# Patient Record
Sex: FEMALE | Race: WHITE | NOT HISPANIC OR LATINO | Employment: UNEMPLOYED | URBAN - METROPOLITAN AREA
[De-identification: names, ages, dates, MRNs, and addresses within clinical notes are randomized per-mention and may not be internally consistent; named-entity substitution may affect disease eponyms.]

---

## 2017-05-01 LAB
BUN SERPL-MCNC: 6.7 MG/DL (ref 7–18)
CREAT SERPL-MCNC: 0.74 MG/DL (ref 0.6–1.3)
GLUCOSE SERPL-MCNC: 88 MG/DL (ref 74–106)

## 2017-07-10 ENCOUNTER — HISTORICAL (OUTPATIENT)
Dept: RESPIRATORY THERAPY | Facility: HOSPITAL | Age: 42
End: 2017-07-10

## 2017-07-10 LAB
ABS NEUT (OLG): 13.86
ALBUMIN SERPL-MCNC: 3.4 GM/DL (ref 3.4–5)
ALBUMIN/GLOB SERPL: 0.8 RATIO (ref 1.1–2)
ALP SERPL-CCNC: 124 UNIT/L (ref 50–136)
ALT SERPL-CCNC: 58 UNIT/L (ref 12–78)
AST SERPL-CCNC: 19 UNIT/L (ref 10–37)
BASOPHILS # BLD AUTO: 0.05 X10(3)/MCL
BASOPHILS NFR BLD AUTO: 0.3 %
BILIRUB SERPL-MCNC: 0.4 MG/DL (ref 0.2–1)
BILIRUBIN DIRECT+TOT PNL SERPL-MCNC: 0.11 MG/DL (ref 0.05–0.2)
BILIRUBIN DIRECT+TOT PNL SERPL-MCNC: 0.29 MG/DL
BUN SERPL-MCNC: 5 MG/DL (ref 7–18)
CALCIUM SERPL-MCNC: 9.4 MG/DL (ref 8.5–10.1)
CHLORIDE SERPL-SCNC: 102 MMOL/L (ref 98–107)
CO2 SERPL-SCNC: 31 MMOL/L (ref 21–32)
CREAT SERPL-MCNC: 0.71 MG/DL (ref 0.55–1.02)
EOSINOPHIL # BLD AUTO: 0.56 X10(3)/MCL
EOSINOPHIL NFR BLD AUTO: 2.9 %
ERYTHROCYTE [DISTWIDTH] IN BLOOD BY AUTOMATED COUNT: 14 %
GLOBULIN SER-MCNC: 4.3 GM/DL (ref 2.4–3.5)
GLUCOSE SERPL-MCNC: 120 MG/DL (ref 74–106)
HCT VFR BLD AUTO: 40.2 % (ref 34–46)
HGB BLD-MCNC: 13.8 GM/DL (ref 11.3–15.4)
IMM GRANULOCYTES # BLD AUTO: 0.12 10*3/UL (ref 0–0.1)
IMM GRANULOCYTES NFR BLD AUTO: 0.6 % (ref 0–1)
LYMPHOCYTES # BLD AUTO: 3.58 X10(3)/MCL
LYMPHOCYTES NFR BLD AUTO: 18.5 %
MCH RBC QN AUTO: 30.3 PG (ref 27–33)
MCHC RBC AUTO-ENTMCNC: 34.3 GM/DL (ref 32–35)
MCV RBC AUTO: 88.2 FL (ref 81–97)
MONOCYTES # BLD AUTO: 1.13 X10(3)/MCL
MONOCYTES NFR BLD AUTO: 5.9 %
NEUTROPHILS # BLD AUTO: 13.86 X10(3)/MCL
NEUTROPHILS NFR BLD AUTO: 71.8 %
PLATELET # BLD AUTO: 323 X10(3)/MCL (ref 151–368)
PMV BLD AUTO: 11 FL
POTASSIUM SERPL-SCNC: 3.6 MMOL/L (ref 3.5–5.1)
PROT SERPL-MCNC: 7.7 GM/DL (ref 6.4–8.2)
RBC # BLD AUTO: 4.56 X10(6)/MCL (ref 3.9–5)
SODIUM SERPL-SCNC: 141 MMOL/L (ref 136–145)
WBC # SPEC AUTO: 19.3 X10(3)/MCL (ref 3.4–9.2)

## 2017-07-31 ENCOUNTER — HISTORICAL (OUTPATIENT)
Dept: LAB | Facility: HOSPITAL | Age: 42
End: 2017-07-31

## 2017-07-31 LAB
ABS NEUT (OLG): 11.11
BASOPHILS # BLD AUTO: 0.05 X10(3)/MCL
BASOPHILS NFR BLD AUTO: 0.3 %
EOSINOPHIL # BLD AUTO: 0.52 X10(3)/MCL
EOSINOPHIL NFR BLD AUTO: 3.1 %
ERYTHROCYTE [DISTWIDTH] IN BLOOD BY AUTOMATED COUNT: 14 %
HCT VFR BLD AUTO: 39.4 % (ref 34–46)
HGB BLD-MCNC: 13.4 GM/DL (ref 11.3–15.4)
IMM GRANULOCYTES # BLD AUTO: 0.08 10*3/UL (ref 0–0.1)
IMM GRANULOCYTES NFR BLD AUTO: 0.5 % (ref 0–1)
LYMPHOCYTES # BLD AUTO: 4.02 X10(3)/MCL
LYMPHOCYTES NFR BLD AUTO: 24.3 %
MCH RBC QN AUTO: 30 PG (ref 27–33)
MCHC RBC AUTO-ENTMCNC: 34 GM/DL (ref 32–35)
MCV RBC AUTO: 88.3 FL (ref 81–97)
MONOCYTES # BLD AUTO: 0.77 X10(3)/MCL
MONOCYTES NFR BLD AUTO: 4.7 %
NEUTROPHILS # BLD AUTO: 11.11 X10(3)/MCL
NEUTROPHILS NFR BLD AUTO: 67.1 %
PLATELET # BLD AUTO: 330 X10(3)/MCL (ref 151–368)
PMV BLD AUTO: 11 FL
RBC # BLD AUTO: 4.46 X10(6)/MCL (ref 3.9–5)
WBC # SPEC AUTO: 16.55 X10(3)/MCL (ref 3.4–9.2)

## 2018-07-26 ENCOUNTER — HISTORICAL (OUTPATIENT)
Dept: LAB | Facility: HOSPITAL | Age: 43
End: 2018-07-26

## 2018-07-26 LAB
ABS NEUT (OLG): 9.72
ALBUMIN SERPL-MCNC: 3.5 GM/DL (ref 3.4–5)
ALBUMIN/GLOB SERPL: 0.8 RATIO (ref 1.1–2)
ALP SERPL-CCNC: 125 UNIT/L (ref 46–116)
ALT SERPL-CCNC: 43 UNIT/L (ref 12–78)
AST SERPL-CCNC: 14 UNIT/L (ref 10–37)
BASOPHILS # BLD AUTO: 0.07 X10(3)/MCL
BASOPHILS NFR BLD AUTO: 0.5 %
BILIRUB SERPL-MCNC: 0.3 MG/DL (ref 0.2–1)
BILIRUBIN DIRECT+TOT PNL SERPL-MCNC: 0.11 MG/DL (ref 0–0.2)
BILIRUBIN DIRECT+TOT PNL SERPL-MCNC: 0.19 MG/DL
BUN SERPL-MCNC: 7 MG/DL (ref 7–18)
CALCIUM SERPL-MCNC: 9.9 MG/DL (ref 8.5–10.1)
CHLORIDE SERPL-SCNC: 102 MMOL/L (ref 98–107)
CHOLEST SERPL-MCNC: 217 MG/DL (ref 0–200)
CHOLEST/HDLC SERPL: 7 {RATIO} (ref 0–4)
CO2 SERPL-SCNC: 27.4 MMOL/L (ref 21–32)
CREAT SERPL-MCNC: 0.6 MG/DL (ref 0.55–1.02)
CREAT UR-MCNC: 143 MG/DL
EOSINOPHIL # BLD AUTO: 0.29 X10(3)/MCL
EOSINOPHIL NFR BLD AUTO: 2 %
ERYTHROCYTE [DISTWIDTH] IN BLOOD BY AUTOMATED COUNT: 15 %
EST. AVERAGE GLUCOSE BLD GHB EST-MCNC: 123 MG/DL
GLOBULIN SER-MCNC: 4.4 GM/DL (ref 2.4–3.5)
GLUCOSE SERPL-MCNC: 105 MG/DL (ref 74–106)
HBA1C MFR BLD: 5.9 % (ref 4.5–6.2)
HCT VFR BLD AUTO: 41.7 % (ref 34–46)
HDLC SERPL-MCNC: 29 MG/DL (ref 35–60)
HGB BLD-MCNC: 14.3 GM/DL (ref 11.3–15.4)
IMM GRANULOCYTES # BLD AUTO: 0.05 10*3/UL (ref 0–0.1)
IMM GRANULOCYTES NFR BLD AUTO: 0.3 % (ref 0–1)
LDLC SERPL CALC-MCNC: 155 MG/DL (ref 50–140)
LYMPHOCYTES # BLD AUTO: 3.59 X10(3)/MCL
LYMPHOCYTES NFR BLD AUTO: 24.2 %
MCH RBC QN AUTO: 29.9 PG (ref 27–33)
MCHC RBC AUTO-ENTMCNC: 34.3 GM/DL (ref 32–35)
MCV RBC AUTO: 87.2 FL (ref 81–97)
MICROALBUMIN UR-MCNC: 1.3 MG/DL
MICROALBUMIN/CREAT RATIO PNL UR: 9.1 MG/GM CR (ref 0–30)
MONOCYTES # BLD AUTO: 1.14 X10(3)/MCL
MONOCYTES NFR BLD AUTO: 7.7 %
NEUTROPHILS # BLD AUTO: 9.72 X10(3)/MCL
NEUTROPHILS NFR BLD AUTO: 65.3 %
PLATELET # BLD AUTO: 322 X10(3)/MCL (ref 151–368)
PMV BLD AUTO: 11 FL
POTASSIUM SERPL-SCNC: 3.4 MMOL/L (ref 3.5–5.1)
PROT SERPL-MCNC: 7.9 GM/DL (ref 6.4–8.2)
RBC # BLD AUTO: 4.78 X10(6)/MCL (ref 3.9–5)
SODIUM SERPL-SCNC: 140 MMOL/L (ref 136–145)
T4 SERPL-MCNC: 12.9 MCG/DL (ref 4.7–13.3)
TRIGL SERPL-MCNC: 164 MG/DL (ref 30–150)
TSH SERPL-ACNC: 2.32 MIU/ML (ref 0.35–3.75)
VLDLC SERPL CALC-MCNC: 33 MG/DL
WBC # SPEC AUTO: 14.86 X10(3)/MCL (ref 3.4–9.2)

## 2018-08-03 ENCOUNTER — HISTORICAL (OUTPATIENT)
Dept: RADIOLOGY | Facility: HOSPITAL | Age: 43
End: 2018-08-03

## 2018-11-19 ENCOUNTER — HISTORICAL (OUTPATIENT)
Dept: RADIOLOGY | Facility: HOSPITAL | Age: 43
End: 2018-11-19

## 2018-12-28 LAB — RAPID GROUP A STREP (OHS): POSITIVE

## 2019-02-12 ENCOUNTER — HISTORICAL (OUTPATIENT)
Dept: LAB | Facility: HOSPITAL | Age: 44
End: 2019-02-12

## 2019-02-12 LAB
BUN SERPL-MCNC: 7 MG/DL (ref 7–18)
CALCIUM SERPL-MCNC: 9.4 MG/DL (ref 8.5–10.1)
CHLORIDE SERPL-SCNC: 105 MMOL/L (ref 98–107)
CHOLEST SERPL-MCNC: 123 MG/DL (ref 50–200)
CHOLEST/HDLC SERPL: 4 {RATIO} (ref 0–5)
CO2 SERPL-SCNC: 33 MMOL/L (ref 21–32)
CREAT SERPL-MCNC: 0.77 MG/DL (ref 0.55–1.02)
CREAT/UREA NIT SERPL: 9
EST. AVERAGE GLUCOSE BLD GHB EST-MCNC: 134 MG/DL
GLUCOSE SERPL-MCNC: 113 MG/DL (ref 74–106)
HBA1C MFR BLD: 6.3 % (ref 4.5–6.2)
HDLC SERPL-MCNC: 28 MG/DL (ref 35–60)
LDLC SERPL CALC-MCNC: 83 MG/DL (ref 50–140)
POTASSIUM SERPL-SCNC: 4.3 MMOL/L (ref 3.5–5.1)
SODIUM SERPL-SCNC: 143 MMOL/L (ref 136–145)
TRIGL SERPL-MCNC: 60 MG/DL (ref 30–150)
VLDLC SERPL CALC-MCNC: 12 MG/DL

## 2019-04-11 ENCOUNTER — HISTORICAL (OUTPATIENT)
Dept: LAB | Facility: HOSPITAL | Age: 44
End: 2019-04-11

## 2019-04-11 LAB — TSH SERPL-ACNC: 2.15 MIU/ML (ref 0.35–3.75)

## 2019-07-24 ENCOUNTER — HISTORICAL (OUTPATIENT)
Dept: LAB | Facility: HOSPITAL | Age: 44
End: 2019-07-24

## 2019-07-24 LAB
ABS NEUT (OLG): 9.13
ALBUMIN SERPL-MCNC: 3.4 GM/DL (ref 3.4–5)
ALBUMIN/GLOB SERPL: 0.8 RATIO (ref 1.1–2)
ALP SERPL-CCNC: 127 UNIT/L (ref 46–116)
ALT SERPL-CCNC: 44 UNIT/L (ref 12–78)
AST SERPL-CCNC: 19 UNIT/L (ref 10–37)
BASOPHILS # BLD AUTO: 0.04 X10(3)/MCL
BASOPHILS NFR BLD AUTO: 0.3 %
BILIRUB SERPL-MCNC: 0.5 MG/DL (ref 0.2–1)
BILIRUBIN DIRECT+TOT PNL SERPL-MCNC: 0.16 MG/DL (ref 0–0.2)
BILIRUBIN DIRECT+TOT PNL SERPL-MCNC: 0.34 MG/DL
BUN SERPL-MCNC: 7 MG/DL (ref 7–18)
CALCIUM SERPL-MCNC: 9.6 MG/DL (ref 8.5–10.1)
CHLORIDE SERPL-SCNC: 103 MMOL/L (ref 98–107)
CHOLEST SERPL-MCNC: 102 MG/DL (ref 50–200)
CHOLEST/HDLC SERPL: 4 {RATIO} (ref 0–5)
CO2 SERPL-SCNC: 30.6 MMOL/L (ref 21–32)
CREAT SERPL-MCNC: 0.62 MG/DL (ref 0.55–1.02)
CREAT UR-MCNC: 399 MG/DL
EOSINOPHIL # BLD AUTO: 0.37 X10(3)/MCL
EOSINOPHIL NFR BLD AUTO: 2.7 %
ERYTHROCYTE [DISTWIDTH] IN BLOOD BY AUTOMATED COUNT: 15 %
EST. AVERAGE GLUCOSE BLD GHB EST-MCNC: 126 MG/DL
GLOBULIN SER-MCNC: 4.1 GM/DL (ref 2.4–3.5)
GLUCOSE SERPL-MCNC: 114 MG/DL (ref 74–106)
HBA1C MFR BLD: 6 % (ref 4.5–6.2)
HCT VFR BLD AUTO: 43 % (ref 34–46)
HDLC SERPL-MCNC: 24 MG/DL (ref 35–60)
HGB BLD-MCNC: 14.1 GM/DL (ref 11.3–15.4)
IMM GRANULOCYTES # BLD AUTO: 0.04 10*3/UL (ref 0–0.1)
IMM GRANULOCYTES NFR BLD AUTO: 0.3 % (ref 0–1)
LDLC SERPL CALC-MCNC: 61 MG/DL (ref 50–140)
LIPASE SERPL-CCNC: 106 UNIT/L (ref 73–393)
LYMPHOCYTES # BLD AUTO: 3.21 X10(3)/MCL
LYMPHOCYTES NFR BLD AUTO: 23.6 %
MCH RBC QN AUTO: 28.9 PG (ref 27–33)
MCHC RBC AUTO-ENTMCNC: 32.8 GM/DL (ref 32–35)
MCV RBC AUTO: 88.1 FL (ref 81–97)
MICROALBUMIN UR-MCNC: 2.3 MG/DL
MICROALBUMIN/CREAT RATIO PNL UR: 5.8 MG/GM CR (ref 0–30)
MONOCYTES # BLD AUTO: 0.81 X10(3)/MCL
MONOCYTES NFR BLD AUTO: 6 %
NEUTROPHILS # BLD AUTO: 9.13 X10(3)/MCL
NEUTROPHILS NFR BLD AUTO: 67.1 %
PLATELET # BLD AUTO: 368 X10(3)/MCL (ref 140–450)
PMV BLD AUTO: 11 FL
POTASSIUM SERPL-SCNC: 3.7 MMOL/L (ref 3.5–5.1)
PROT SERPL-MCNC: 7.5 GM/DL (ref 6.4–8.2)
RBC # BLD AUTO: 4.88 X10(6)/MCL (ref 3.9–5)
SODIUM SERPL-SCNC: 142 MMOL/L (ref 136–145)
TRIGL SERPL-MCNC: 83 MG/DL (ref 30–150)
TSH SERPL-ACNC: 1.94 MIU/ML (ref 0.35–3.75)
VLDLC SERPL CALC-MCNC: 17 MG/DL
WBC # SPEC AUTO: 13.6 X10(3)/MCL (ref 3.4–9.2)

## 2019-07-31 ENCOUNTER — HISTORICAL (OUTPATIENT)
Dept: LAB | Facility: HOSPITAL | Age: 44
End: 2019-07-31

## 2019-07-31 LAB
DEPRECATED CALCIDIOL+CALCIFEROL SERPL-MC: 17.73 NG/ML (ref 30–80)
GGT SERPL-CCNC: 42 UNIT/L (ref 5–85)

## 2019-08-07 ENCOUNTER — HISTORICAL (OUTPATIENT)
Dept: OCCUPATIONAL THERAPY | Facility: HOSPITAL | Age: 44
End: 2019-08-07

## 2019-08-13 ENCOUNTER — HISTORICAL (OUTPATIENT)
Dept: OCCUPATIONAL THERAPY | Facility: HOSPITAL | Age: 44
End: 2019-08-13

## 2019-08-16 ENCOUNTER — HISTORICAL (OUTPATIENT)
Dept: OCCUPATIONAL THERAPY | Facility: HOSPITAL | Age: 44
End: 2019-08-16

## 2019-08-20 ENCOUNTER — HISTORICAL (OUTPATIENT)
Dept: OCCUPATIONAL THERAPY | Facility: HOSPITAL | Age: 44
End: 2019-08-20

## 2019-08-27 ENCOUNTER — HISTORICAL (OUTPATIENT)
Dept: OCCUPATIONAL THERAPY | Facility: HOSPITAL | Age: 44
End: 2019-08-27

## 2019-09-05 ENCOUNTER — HOSPITAL ENCOUNTER (OUTPATIENT)
Dept: MEDSURG UNIT | Facility: HOSPITAL | Age: 44
End: 2019-09-07
Attending: FAMILY MEDICINE | Admitting: FAMILY MEDICINE

## 2019-09-06 LAB
ABS NEUT (OLG): 5.47
ABS NEUT (OLG): 6.72
BASOPHILS # BLD AUTO: 0.04 X10(3)/MCL
BASOPHILS # BLD AUTO: 0.05 X10(3)/MCL
BASOPHILS NFR BLD AUTO: 0.5 %
BASOPHILS NFR BLD AUTO: 0.5 %
BUN SERPL-MCNC: 9 MG/DL (ref 7–18)
C DIFF INTERP: NEGATIVE
CALCIUM SERPL-MCNC: 8.4 MG/DL (ref 8.5–10.1)
CHLORIDE SERPL-SCNC: 105 MMOL/L (ref 98–107)
CO2 SERPL-SCNC: 28.5 MMOL/L (ref 21–32)
CREAT SERPL-MCNC: 0.76 MG/DL (ref 0.55–1.02)
CREAT/UREA NIT SERPL: 12
EOSINOPHIL # BLD AUTO: 0.28 X10(3)/MCL
EOSINOPHIL # BLD AUTO: 0.28 X10(3)/MCL
EOSINOPHIL NFR BLD AUTO: 2.8 %
EOSINOPHIL NFR BLD AUTO: 3.6 %
ERYTHROCYTE [DISTWIDTH] IN BLOOD BY AUTOMATED COUNT: 14 %
ERYTHROCYTE [DISTWIDTH] IN BLOOD BY AUTOMATED COUNT: 14 %
GLUCOSE SERPL-MCNC: 160 MG/DL (ref 74–106)
HCT VFR BLD AUTO: 40.5 % (ref 34–46)
HCT VFR BLD AUTO: 41 % (ref 34–46)
HGB BLD-MCNC: 13.5 GM/DL (ref 11.3–15.4)
HGB BLD-MCNC: 13.9 GM/DL (ref 11.3–15.4)
IMM GRANULOCYTES # BLD AUTO: 0.02 10*3/UL (ref 0–0.1)
IMM GRANULOCYTES # BLD AUTO: 0.02 10*3/UL (ref 0–0.1)
IMM GRANULOCYTES NFR BLD AUTO: 0.2 % (ref 0–1)
IMM GRANULOCYTES NFR BLD AUTO: 0.3 % (ref 0–1)
LYMPHOCYTES # BLD AUTO: 1.54 X10(3)/MCL
LYMPHOCYTES # BLD AUTO: 1.98 X10(3)/MCL
LYMPHOCYTES NFR BLD AUTO: 19.6 %
LYMPHOCYTES NFR BLD AUTO: 20 %
MCH RBC QN AUTO: 29.2 PG (ref 27–33)
MCH RBC QN AUTO: 29.4 PG (ref 27–33)
MCHC RBC AUTO-ENTMCNC: 33.3 GM/DL (ref 32–35)
MCHC RBC AUTO-ENTMCNC: 33.9 GM/DL (ref 32–35)
MCV RBC AUTO: 86.7 FL (ref 81–97)
MCV RBC AUTO: 87.5 FL (ref 81–97)
MONOCYTES # BLD AUTO: 0.49 X10(3)/MCL
MONOCYTES # BLD AUTO: 0.87 X10(3)/MCL
MONOCYTES NFR BLD AUTO: 6.3 %
MONOCYTES NFR BLD AUTO: 8.8 %
NEUTROPHILS # BLD AUTO: 5.47 X10(3)/MCL
NEUTROPHILS # BLD AUTO: 6.72 X10(3)/MCL
NEUTROPHILS NFR BLD AUTO: 67.7 %
NEUTROPHILS NFR BLD AUTO: 69.7 %
PLATELET # BLD AUTO: 243 X10(3)/MCL (ref 140–450)
PLATELET # BLD AUTO: 254 X10(3)/MCL (ref 140–450)
PMV BLD AUTO: 11 FL
PMV BLD AUTO: 11 FL
POTASSIUM SERPL-SCNC: 3.4 MMOL/L (ref 3.5–5.1)
RBC # BLD AUTO: 4.63 X10(6)/MCL (ref 3.9–5)
RBC # BLD AUTO: 4.73 X10(6)/MCL (ref 3.9–5)
SODIUM SERPL-SCNC: 141 MMOL/L (ref 136–145)
WBC # SPEC AUTO: 7.84 X10(3)/MCL (ref 3.4–9.2)
WBC # SPEC AUTO: 9.92 X10(3)/MCL (ref 3.4–9.2)

## 2019-09-09 LAB — FINAL CULTURE: NORMAL

## 2019-11-08 ENCOUNTER — HISTORICAL (OUTPATIENT)
Dept: RADIOLOGY | Facility: HOSPITAL | Age: 44
End: 2019-11-08

## 2019-11-15 ENCOUNTER — HISTORICAL (OUTPATIENT)
Dept: RADIOLOGY | Facility: HOSPITAL | Age: 44
End: 2019-11-15

## 2020-02-19 ENCOUNTER — HISTORICAL (OUTPATIENT)
Dept: ADMINISTRATIVE | Facility: HOSPITAL | Age: 45
End: 2020-02-19

## 2020-02-19 LAB
ALBUMIN SERPL-MCNC: 3.6 GM/DL (ref 3.5–5.2)
ALBUMIN/GLOB SERPL: 0.9 RATIO (ref 1.1–2)
ALP SERPL-CCNC: 132 UNIT/L (ref 30–113)
ALT SERPL-CCNC: 32 UNIT/L (ref 10–45)
AST SERPL-CCNC: 14 UNIT/L (ref 15–37)
BILIRUB SERPL-MCNC: 0.4 MG/DL
BILIRUBIN DIRECT+TOT PNL SERPL-MCNC: 0.2 MG/DL
BILIRUBIN DIRECT+TOT PNL SERPL-MCNC: 0.2 MG/DL (ref 0–0.5)
BUN SERPL-MCNC: 8 MG/DL (ref 7–18.7)
CALCIUM SERPL-MCNC: 9.9 MG/DL (ref 8.4–10.2)
CHLORIDE SERPL-SCNC: 104 MMOL/L (ref 98–107)
CO2 SERPL-SCNC: 27 MEQ/L (ref 22–29)
CREAT SERPL-MCNC: 0.73 MG/DL (ref 0.55–1.02)
EST. AVERAGE GLUCOSE BLD GHB EST-MCNC: 126 MG/DL
GLOBULIN SER-MCNC: 3.8 GM/DL (ref 2.4–3.5)
GLUCOSE SERPL-MCNC: 144 MG/DL (ref 74–100)
HBA1C MFR BLD: 6 % (ref 4–6)
POTASSIUM SERPL-SCNC: 4.1 MMOL/L (ref 3.5–5.1)
PROT SERPL-MCNC: 7.4 GM/DL (ref 6.4–8.3)
SODIUM SERPL-SCNC: 140 MMOL/L (ref 136–145)

## 2020-06-24 ENCOUNTER — HISTORICAL (OUTPATIENT)
Dept: RADIOLOGY | Facility: HOSPITAL | Age: 45
End: 2020-06-24

## 2020-07-16 ENCOUNTER — HISTORICAL (OUTPATIENT)
Dept: RADIOLOGY | Facility: HOSPITAL | Age: 45
End: 2020-07-16

## 2020-09-18 ENCOUNTER — HISTORICAL (OUTPATIENT)
Dept: LAB | Facility: HOSPITAL | Age: 45
End: 2020-09-18

## 2020-09-18 LAB
EST. AVERAGE GLUCOSE BLD GHB EST-MCNC: 126 MG/DL
HBA1C MFR BLD: 6 % (ref 4–6)

## 2020-10-12 ENCOUNTER — HISTORICAL (OUTPATIENT)
Dept: LAB | Facility: HOSPITAL | Age: 45
End: 2020-10-12

## 2020-10-12 LAB
ABS NEUT (OLG): 8.07
ALBUMIN SERPL-MCNC: 3.4 GM/DL (ref 3.5–5)
ALBUMIN/GLOB SERPL: 0.9 RATIO (ref 1.1–2)
ALP SERPL-CCNC: 114 UNIT/L (ref 40–150)
ALT SERPL-CCNC: 25 UNIT/L (ref 0–55)
AST SERPL-CCNC: 13 UNIT/L (ref 5–34)
BASOPHILS # BLD AUTO: 0.03 X10(3)/MCL
BASOPHILS NFR BLD AUTO: 0.2 %
BILIRUB SERPL-MCNC: 0.3 MG/DL
BILIRUBIN DIRECT+TOT PNL SERPL-MCNC: 0.1 MG/DL
BILIRUBIN DIRECT+TOT PNL SERPL-MCNC: 0.2 MG/DL (ref 0–0.5)
BUN SERPL-MCNC: 7 MG/DL (ref 7–18.7)
CALCIUM SERPL-MCNC: 9.3 MG/DL (ref 8.4–10.2)
CHLORIDE SERPL-SCNC: 104 MMOL/L (ref 98–107)
CHOLEST SERPL-MCNC: 138 MG/DL
CHOLEST/HDLC SERPL: 4 {RATIO} (ref 0–5)
CO2 SERPL-SCNC: 31 MEQ/L (ref 22–29)
CREAT SERPL-MCNC: 0.69 MG/DL (ref 0.55–1.02)
CREAT UR-MCNC: 227.2 MG/DL (ref 45–106)
DEPRECATED CALCIDIOL+CALCIFEROL SERPL-MC: 34.7 NG/ML (ref 6.6–49.9)
EOSINOPHIL # BLD AUTO: 0.22 X10(3)/MCL
EOSINOPHIL NFR BLD AUTO: 1.8 %
ERYTHROCYTE [DISTWIDTH] IN BLOOD BY AUTOMATED COUNT: 15 %
GLOBULIN SER-MCNC: 3.8 GM/DL (ref 2.4–3.5)
GLUCOSE SERPL-MCNC: 103 MG/DL (ref 74–100)
HCT VFR BLD AUTO: 42.3 % (ref 34–46)
HDLC SERPL-MCNC: 31 MG/DL (ref 35–60)
HGB BLD-MCNC: 13.7 GM/DL (ref 11.3–15.4)
IMM GRANULOCYTES # BLD AUTO: 0.04 10*3/UL (ref 0–0.1)
IMM GRANULOCYTES NFR BLD AUTO: 0.3 % (ref 0–1)
LDLC SERPL CALC-MCNC: 92 MG/DL (ref 50–140)
LYMPHOCYTES # BLD AUTO: 3.37 X10(3)/MCL
LYMPHOCYTES NFR BLD AUTO: 27 %
MCH RBC QN AUTO: 28.8 PG (ref 27–33)
MCHC RBC AUTO-ENTMCNC: 32.4 GM/DL (ref 32–35)
MCV RBC AUTO: 88.9 FL (ref 81–97)
MICROALBUMIN UR-MCNC: 9.9 UG/ML
MICROALBUMIN/CREAT RATIO PNL UR: 4.4 MG/GM CR (ref 0–30)
MONOCYTES # BLD AUTO: 0.75 X10(3)/MCL
MONOCYTES NFR BLD AUTO: 6 %
NEUTROPHILS # BLD AUTO: 8.07 X10(3)/MCL
NEUTROPHILS NFR BLD AUTO: 64.7 %
PLATELET # BLD AUTO: 351 X10(3)/MCL (ref 140–450)
PMV BLD AUTO: 11 FL
POTASSIUM SERPL-SCNC: 4.2 MMOL/L (ref 3.5–5.1)
PROT SERPL-MCNC: 7.2 GM/DL (ref 6.4–8.3)
RBC # BLD AUTO: 4.76 X10(6)/MCL (ref 3.9–5)
SODIUM SERPL-SCNC: 144 MMOL/L (ref 136–145)
TRIGL SERPL-MCNC: 76 MG/DL (ref 37–140)
TSH SERPL-ACNC: 1.1 UIU/ML (ref 0.35–4.94)
VIT B12 SERPL-MCNC: 344 PG/ML (ref 213–816)
VLDLC SERPL CALC-MCNC: 15 MG/DL
WBC # SPEC AUTO: 12.48 X10(3)/MCL (ref 3.4–9.2)

## 2020-10-21 ENCOUNTER — HISTORICAL (OUTPATIENT)
Dept: RADIOLOGY | Facility: HOSPITAL | Age: 45
End: 2020-10-21

## 2020-10-21 LAB — CREAT SERPL-MCNC: 0.83 MG/DL (ref 0.55–1.02)

## 2020-10-29 ENCOUNTER — HISTORICAL (OUTPATIENT)
Dept: RADIOLOGY | Facility: HOSPITAL | Age: 45
End: 2020-10-29

## 2021-07-28 ENCOUNTER — HISTORICAL (OUTPATIENT)
Dept: LAB | Facility: HOSPITAL | Age: 46
End: 2021-07-28

## 2021-07-28 LAB
ABS NEUT (OLG): 7.35
ALBUMIN SERPL-MCNC: 3.4 GM/DL (ref 3.5–5)
ALP SERPL-CCNC: 74 UNIT/L (ref 40–150)
ALT SERPL-CCNC: 16 UNIT/L (ref 0–55)
AST SERPL-CCNC: 11 UNIT/L (ref 5–34)
BASOPHILS # BLD AUTO: 0.04 X10(3)/MCL
BASOPHILS NFR BLD AUTO: 0.3 %
BILIRUB SERPL-MCNC: 0.2 MG/DL
BILIRUBIN DIRECT+TOT PNL SERPL-MCNC: 0.1 MG/DL
BILIRUBIN DIRECT+TOT PNL SERPL-MCNC: 0.1 MG/DL (ref 0–0.5)
EOSINOPHIL # BLD AUTO: 0.35 X10(3)/MCL
EOSINOPHIL NFR BLD AUTO: 2.8 %
ERYTHROCYTE [DISTWIDTH] IN BLOOD BY AUTOMATED COUNT: 15 %
EST. AVERAGE GLUCOSE BLD GHB EST-MCNC: 100 MG/DL
HBA1C MFR BLD: 5.1 % (ref 4–6)
HCT VFR BLD AUTO: 39.8 % (ref 34–46)
HGB BLD-MCNC: 13.1 GM/DL (ref 11.3–15.4)
IMM GRANULOCYTES # BLD AUTO: 0.05 10*3/UL (ref 0–0.1)
IMM GRANULOCYTES NFR BLD AUTO: 0.4 % (ref 0–1)
LYMPHOCYTES # BLD AUTO: 3.9 X10(3)/MCL
LYMPHOCYTES NFR BLD AUTO: 31.1 %
MCH RBC QN AUTO: 30.8 PG (ref 27–33)
MCHC RBC AUTO-ENTMCNC: 32.9 GM/DL (ref 32–35)
MCV RBC AUTO: 93.4 FL (ref 81–97)
MONOCYTES # BLD AUTO: 0.87 X10(3)/MCL
MONOCYTES NFR BLD AUTO: 6.9 %
NEUTROPHILS # BLD AUTO: 7.35 X10(3)/MCL
NEUTROPHILS NFR BLD AUTO: 58.5 %
PLATELET # BLD AUTO: 280 X10(3)/MCL (ref 140–450)
PMV BLD AUTO: 11 FL
PROT SERPL-MCNC: 6.7 GM/DL (ref 6.4–8.3)
RBC # BLD AUTO: 4.26 X10(6)/MCL (ref 3.9–5)
TSH SERPL-ACNC: 3.8 UIU/ML (ref 0.35–4.94)
WBC # SPEC AUTO: 12.56 X10(3)/MCL (ref 3.4–9.2)

## 2021-11-23 ENCOUNTER — HISTORICAL (OUTPATIENT)
Dept: LAB | Facility: HOSPITAL | Age: 46
End: 2021-11-23

## 2021-11-23 LAB
APPEARANCE, UA: CLEAR
BACTERIA SPEC CULT: ABNORMAL
BILIRUB UR QL STRIP: ABNORMAL
COLOR UR: YELLOW
CREAT UR-MCNC: 292.2 MG/DL (ref 45–106)
GLUCOSE (UA): NEGATIVE
HGB UR QL STRIP: ABNORMAL
KETONES UR QL STRIP: NEGATIVE
LEUKOCYTE ESTERASE UR QL STRIP: NEGATIVE
MICROALBUMIN UR-MCNC: 19.3 UG/ML
MICROALBUMIN/CREAT RATIO PNL UR: 6.6 MG/GM CR (ref 0–30)
MUCOUS THREADS URNS QL MICRO: PRESENT
NITRITE UR QL STRIP: NEGATIVE
PH UR STRIP: 6 [PH] (ref 4.6–8)
PROT UR QL STRIP: NEGATIVE
RBC #/AREA URNS HPF: ABNORMAL /HPF
SP GR UR STRIP: >=1.03 (ref 1–1.03)
SQUAMOUS EPITHELIAL, UA: ABNORMAL
UROBILINOGEN UR STRIP-ACNC: 1
WBC #/AREA URNS HPF: ABNORMAL /HPF

## 2021-12-01 ENCOUNTER — HISTORICAL (OUTPATIENT)
Dept: LAB | Facility: HOSPITAL | Age: 46
End: 2021-12-01

## 2021-12-01 LAB — SARS-COV-2 AG RESP QL IA.RAPID: NEGATIVE

## 2022-04-09 ENCOUNTER — HISTORICAL (OUTPATIENT)
Dept: ADMINISTRATIVE | Facility: HOSPITAL | Age: 47
End: 2022-04-09

## 2022-04-27 VITALS
SYSTOLIC BLOOD PRESSURE: 124 MMHG | HEIGHT: 65 IN | BODY MASS INDEX: 41.47 KG/M2 | DIASTOLIC BLOOD PRESSURE: 78 MMHG | WEIGHT: 248.88 LBS | OXYGEN SATURATION: 95 %

## 2022-05-02 NOTE — HISTORICAL [facility]
This is a historical note converted from [employer]. Formatting and pictures may have been removed.  Please reference [employer] for original formatting and attached multimedia. Admit and Discharge Dates  Admit Date: 09/05/2019  Discharge Date: 09/07/2019  Physicians  Attending Physician - Jazzy BARONE, Hector HARTLEY  Admitting Physician - Jazzy BARONE, Hector HARTLEY  Consulting Physician - Mercedez BARONE, Gregory  Primary Care Physician - Elaina BARONE, Makayla GANT  Discharge Diagnosis  1.?Abdominal pain?R10.9  2.?Acute colitis?K52.9  3.?Type 2 diabetes mellitus?E11.9  4.?Diabetic neuropathy?E11.40  5.?GERD - Gastro-esophageal reflux disease?K21.9  6.?HTN - Hypertension?I10  7.?Hyperlipidemia?E78.5  8.?Hypothyroidism?E03.9  9.?Tobacco user?Z72.0  Surgical Procedures  No procedures recorded for this visit.  Immunizations  No immunizations recorded for this visit.  Hospital Course  Clinical course uneventful.? She was started on IV antibiotics after stool studies were obtained. ?She was made n.p.o.and ?started on?IV fluids. Her labs were trended to monitor her response to treatment. She clinically improved and was able to tolerate clears as well as the advance in her diet. Her C. Diff returned negative and her leukocytosis resolved. She was medically cleared for discharge in stable condition with followup instructions and prescriptions.  ?  D/C Time: 36 minutes  Objective  Vitals & Measurements  T:?36.9? ?C (Oral)? TMIN:?36.6? ?C (Oral)? TMAX:?37.0? ?C (Oral)? HR:?89(Peripheral)? HR:?84(Monitored)? RR:?20? BP:?132/79? SpO2:?96%?  Physical Exam  General: Alert, sitting up in bed, in no acute distress  Eyes: EOMI, no scleral icterus  HEENT: NCAT, oral mucosa moist, neck supple  CV: RRR  Respiratory: CTA bilaterally  GI: + BS, soft, nontender, nondistended  : No CVA or suprapubic tenderness palpation  MSK/extremities: No pitting edema, cyanosis, or clubbing.? MAEW  Neuro: GCS 15  Psych: Calm, cooperative with exam.? Mood and affect  appropriate.  Integument: Warm, dry  Patient Discharge Condition  Stable  Discharge Disposition  Home   Discharge Medication Reconciliation  Prescribed  ciprofloxacin (Cipro 500 mg oral tablet)?500 mg, Oral, q12hr  lactobacillus acidophilus and bulgaricus (lactobacillus acidophilus and bulgaricus oral tablet, chewable)?4 tab(s), Oral, TIDWM  metroNIDAZOLE (Flagyl 500 mg oral tablet)?500 mg, Oral, q8hr  ondansetron (Zofran 4 mg oral tablet)?4 mg, Oral, q8hr, PRN nausea/vomiting  traMADol (traMADol 50 mg oral tablet)?50 mg, Oral, q4hr, PRN for pain  Continue  QUEtiapine (QUEtiapine 300 mg oral tablet)?300 mg, Oral, qPM  albuterol (Ventolin HFA 90 mcg/inh inhalation aerosol)?2 puff(s), INH, q4hr, PRN for wheezing  amphetamine-dextroamphetamine (Adderall 30 mg oral tablet)?30 mg, Oral, BID  atorvastatin (atorvastatin 20 mg oral tablet)?See Instructions  escitalopram (escitalopram 20 mg oral tablet)?20 mg, Oral, Daily  lisinopril (lisinopril 20 mg oral tablet)?See Instructions  metFORMIN (metformin 500 mg oral tablet)?See Instructions  omeprazole (omeprazole 40 mg oral DR capsule)?See Instructions  spironolactone (spironolactone 25 mg oral tablet)?See Instructions  Discontinue  acetaminophen-HYDROcodone (Norco 5 mg-325 mg oral tablet)?1 tab(s), Oral, q4hr, PRN for pain  Education and Orders Provided  Food Choices to Help Relieve Diarrhea, Adult  Colitis  Discharge Activity - Activity as Tolerated?  Discharge Diet - Other: Advance as tolerated?  Discharge - 09/06/19 12:30:00 CDT, Home Pending ability to tolerate advance in diet.?  Discharge Diet - Diabetic Advance as tolerated?  Discharge Activity - Activity as Tolerated?  Discharge - 09/07/19 16:59:00 CDT, Home?  Follow up  Report to Emergency Department if symptoms return or worsen  Follow-up with PCP in 3 to 5 days

## 2022-05-02 NOTE — HISTORICAL [facility]
This is a historical note converted from [employer]. Formatting and pictures may have been removed.  Please reference [employer] for original formatting and attached multimedia. Chief Complaint  My stomach feels a little better  History of Present Illness  42 y/o WF with a h/o HTN, hyperlipidemia, hypothyroidism, depression, ADHD,?and type II DM who presented to the ER with a 1 day h/o abdominal pain. She was in her normal state of health until yesterday afternoon at 3:30 PM when she developed intermittent lower quadrant abdominal pain up to 10/10 in severity and nausea. She denies any vomiting, diarrhea, or constipation and states that her last BM was earlier today. She denies any h/o C. diff colitis or recent antibiotic use. On arrival to the ER she was tachycardic with a heart rate of 104 and mildly hypertensive with a blood pressure of 142/95 and his initial labwork was remarkable for a WBC count of 11.72 and a glucose of 114. KUB showed a nonspecific, nonobstructive bowel gas pattern and CT of the abdomen and pelvis revealed nonspecific colitis involving predominantly the ascending colon, transverse colon, and splenic flexure with diffuse wall thickening and mild surrounding inflammatory changes. She has received ciprofloxacin 400 mg IV and flagyl 500 mg IV in the ER and states that her abdominal pain has decreased to 2/10 in severity. She is otherwise in stable condition and she has no other complaints today.  Review of Systems  Constitutional symptoms:? Reviewed and negative except as documented in HPI.  Skin symptoms:? Reviewed and negative except as documented in HPI.?  Eye symptoms:? Reviewed and negative except as documented in HPI.?  ENMT symptoms:? Reviewed and negative except as documented in HPI.  Respiratory symptoms:? Reviewed and negative except as documented in HPI.  Cardiovascular symptoms:? Reviewed and negative except as documented in HPI.  Gastrointestinal symptoms:? Reviewed and negative except as  documented in HPI.  Genitourinary symptoms:? Reviewed and negative except as documented in HPI.  Musculoskeletal symptoms: Reviewed and negative except as documented in HPI.  Neurologic symptoms:? Reviewed and negative except as documented in HPI.  Psychiatric symptoms: Reviewed and negative except as documented in HPI.  Hematologic symptoms: Reviewed and negative except as documented in HPI.  ?  Additional review of systems information: All other systems reviewed and otherwise negative.  Physical Exam  Vitals & Measurements  T:?36.9? ?C (Oral)? TMIN:?36.6? ?C (Oral)? TMAX:?37.0? ?C (Oral)? HR:?89(Peripheral)? HR:?84(Monitored)? RR:?20? BP:?132/79? SpO2:?96%?  GEN: Patient alert. Well developed and well nourished?female, In no acute distress  Eyes: No scleral icterus, EOMI, No conjunctival injection or pallor  HENT: NCAT, OMM, neck supple  LYMPH: No cervical adenopathy  CV: RRR. No M/R/G, No JVD noted. Peripheral pulses palpable  RESP: Symmetrical rise and fall of chest. Breathing unlabored. CTA B/L. No wheezing, rhonchi or crackles  ABD: Hyperactive BS, soft, mild generalized tenderness without guarding or rebound TTP, ND  : No CVA or suprapubic TTP.  MSK/EXT: No gross deformities noted, MAEW  SKIN: Normal color for ethnicity, dry, warm, normal turgor  NEURO: GCS 15, CN II through XII grossly intact, sensation and strength of extremities grossly normal.  PSYCH: Mood and affect appropriate, good judgement, cooperative with exam  Assessment/Plan  IV flagyl and cipro  F/u on stool studies  IVF  Trial of clears  Trend labs to monitor response to treatment  Replete electrolytes as warranted  GI/DVT prophylaxis as warranted  ?  Home medications reviewed and restarted as appropriate to ensure that chronic medical problems remained stable during admission  ?  ?   Problem List/Past Medical History  Ongoing  Diabetic neuropathy  Elevated alkaline phosphatase level  Epigastric pain  Essential hypertension  GERD -  Gastro-esophageal reflux disease  HTN - Hypertension  Hyperlipidemia  Hypothyroidism  Pituitary tumor  Tobacco user  Type 2 diabetes mellitus  Historical  Diabetes mellitus  Procedure/Surgical History  Total hysterectomy (12/13/2018)  Eye examination (10/16/2018)  Mammogram (08/03/2018)  colonoscopy (Week of 12/19/2017)  Hysterectomy (2006)  Cholecystectomy (2001)  Lt rotator cuff repair  Lt shoulder  Rt hand corpal tunel   Medications  Home  Adderall 30 mg oral tablet, 30 mg= 1 tab(s), Oral, BID  atorvastatin 20 mg oral tablet, See Instructions, 4 refills  Cipro 500 mg oral tablet, 500 mg= 1 tab(s), Oral, q12hr  escitalopram 20 mg oral tablet, 20 mg= 1 tab(s), Oral, Daily, 2 refills  Flagyl 500 mg oral tablet, 500 mg= 1 tab(s), Oral, q8hr  lactobacillus acidophilus and bulgaricus oral tablet, chewable, 4 tab(s), Oral, TIDWM, 1 refills  lisinopril 20 mg oral tablet, See Instructions, 3 refills  metformin 500 mg oral tablet, See Instructions, 4 refills  omeprazole 40 mg oral DR capsule, See Instructions, 2 refills  QUEtiapine 300 mg oral tablet, 300 mg= 1 tab(s), Oral, qPM  spironolactone 25 mg oral tablet, See Instructions, 3 refills  traMADol 50 mg oral tablet, 50 mg= 1 tab(s), Oral, q4hr, PRN  Ventolin HFA 90 mcg/inh inhalation aerosol, 2 puff(s), INH, q4hr, PRN, 2 refills  Zofran 4 mg oral tablet, 4 mg= 1 tab(s), Oral, q8hr, PRN, 1 refills  Allergies  codeine?(vomiting)  Social History  Abuse/Neglect  No, Yes, Yes, 09/05/2019  Alcohol  Never, 08/17/2017  Employment/School  Unemployed, 07/26/2018  Exercise  Exercise duration: 0., 07/26/2018  Home/Environment  Lives with Children, Significant other., 07/26/2018  Nutrition/Health  Regular, 07/26/2018  Sexual  Sexually active: Yes., 07/26/2018  Substance Use  Drug use interferes with work/home: No. Household substance abuse concerns: No., 07/31/2019  Tobacco  5-9 cigarettes (between 1/4 to 1/2 pack)/day in last 30 days, No, 09/05/2019  Family  History  Hyperlipidemia.: Mother.  Hypertension.: Mother.  Immunizations  Vaccine Date Status   influenza virus vaccine, inactivated 12/13/2018 Given   tetanus/diphtheria/pertussis, acel(Tdap) 08/02/2018 Given   pneumococcal 23-polyvalent vaccine 08/02/2018 Given   diphtheria/tetanus/pertussis (DTaP) ped 08/02/1983 Recorded   Lab Results  Test Name Test Result Date/Time   Sodium Lvl 141 mmol/L 09/06/2019 05:05 CDT   Potassium Lvl 3.4 mmol/L (Low) 09/06/2019 05:05 CDT   Chloride 105 mmol/L 09/06/2019 05:05 CDT   CO2 28.5 mmol/L 09/06/2019 05:05 CDT   Calcium Lvl 8.4 mg/dL (Low) 09/06/2019 05:05 CDT   Glucose Lvl 160 mg/dL (High) 09/06/2019 05:05 CDT   BUN 9 mg/dL 09/06/2019 05:05 CDT   Creatinine 0.76 mg/dL 09/06/2019 05:05 CDT   Lipase Lvl 148 unit/L 09/05/2019 14:53 CDT   WBC 9.92 x10(3)/mcL (High) 09/06/2019 05:05 CDT   WBC 11.72 x10(3)/mcL (High) 09/05/2019 14:53 CDT   Diagnostic Results  (09/05/2019 16:37 CDT CT Abdomen and Pelvis W/O Contrast)  Reason For Exam  Abdominal Pain  ?  Radiology Report  CT Abdomen and Pelvis W/O Contrast  ?  REASON FOR STUDY: Abdominal Pain?  ?  TECHNIQUE: CT imaging was performed of the abdomen and pelvis without  intravenous contrast. Dose length product is 915 mGycm. Automatic  exposure control, adjustment of mA/kV or iterative reconstruction  technique was used to limit radiation dose.  ?  COMPARISON: CT abdomen pelvis dated 1/29/2019?  ?  FINDINGS:?  Assessment of the visceral organs and vasculature is limited by the  lack of IV contrast.  ?  Liver/biliary: No concerning hepatic findings. The gallbladder has  been surgically removed.?  ?  Pancreas: Normal.  ?  Spleen: Normal.  ?  Adrenals: A 1.2 cm fat density nodule in the left adrenal gland is  stable and consistent with an adenoma.  ?  Kidneys, ureters and bladder: Normal. The bladder is within normal  limits.  ?  Reproductive organs: The uterus has been surgically removed.  ?  Stomach/bowel: The stomach is unremarkable.  There is no evidence of  bowel obstruction. There are mild inflammatory changes surrounding the  ascending colon, transverse colon and splenic flexure with associated  diffuse wall thickening. The sigmoid colon appears unaffected. There  are a few sigmoid diverticula. The appendix is normal.  ?  Lymph nodes: No pathologically enlarged lymph node identified with  noncontrast technique.  ?  Peritoneum: No fluid collection or free air.  ?  Vessels: Scattered calcifications of the abdominal aorta.?  ?  Abdominal wall: Normal.  ?  Lung bases: No consolidation or pleural effusion.  ?  Bones: No acute osseous findings.?  ?  IMPRESSION:?  Nonspecific colitis involving predominantly the ascending colon,  transverse colon and splenic flexure with diffuse wall thickening and  mild surrounding inflammatory changes. No fluid collection or free  air.  ? [1]     [1]?CT Abdomen and Pelvis W/O Contrast; Janine Santizo MD 09/05/2019 16:37 CDT

## 2022-05-17 ENCOUNTER — HOSPITAL ENCOUNTER (EMERGENCY)
Facility: HOSPITAL | Age: 47
Discharge: HOME | End: 2022-05-17
Attending: FAMILY MEDICINE
Payer: MEDICAID

## 2022-05-17 ENCOUNTER — DOCUMENTATION ONLY (OUTPATIENT)
Dept: FAMILY MEDICINE | Facility: CLINIC | Age: 47
End: 2022-05-17

## 2022-05-17 VITALS
BODY MASS INDEX: 43.54 KG/M2 | DIASTOLIC BLOOD PRESSURE: 87 MMHG | TEMPERATURE: 99 F | RESPIRATION RATE: 20 BRPM | WEIGHT: 255 LBS | SYSTOLIC BLOOD PRESSURE: 129 MMHG | OXYGEN SATURATION: 97 % | HEIGHT: 64 IN | HEART RATE: 80 BPM

## 2022-05-17 DIAGNOSIS — G40.909 SEIZURE DISORDER: Primary | ICD-10-CM

## 2022-05-17 DIAGNOSIS — E86.0 DEHYDRATION, MILD: ICD-10-CM

## 2022-05-17 LAB
ALBUMIN SERPL-MCNC: 3.5 GM/DL (ref 3.5–5)
ALBUMIN/GLOB SERPL: 0.9 RATIO (ref 1.1–2)
ALP SERPL-CCNC: 86 UNIT/L (ref 40–150)
ALT SERPL-CCNC: 27 UNIT/L (ref 0–55)
AMPHET UR QL SCN: NEGATIVE
APPEARANCE UR: CLEAR
AST SERPL-CCNC: 14 UNIT/L (ref 5–34)
BACTERIA #/AREA URNS AUTO: ABNORMAL /HPF
BARBITURATE SCN PRESENT UR: NEGATIVE
BASOPHILS # BLD AUTO: 0.08 X10(3)/MCL (ref 0–0.2)
BASOPHILS NFR BLD AUTO: 0.6 %
BENZODIAZ UR QL SCN: NEGATIVE
BILIRUB UR QL STRIP.AUTO: NEGATIVE MG/DL
BILIRUBIN DIRECT+TOT PNL SERPL-MCNC: 0.3 MG/DL
BUN SERPL-MCNC: 6 MG/DL (ref 7–18.7)
CALCIUM SERPL-MCNC: 8.6 MG/DL (ref 8.4–10.2)
CANNABINOIDS UR QL SCN: POSITIVE
CHLORIDE SERPL-SCNC: 105 MMOL/L (ref 98–107)
CO2 SERPL-SCNC: 26 MMOL/L (ref 22–29)
COCAINE UR QL SCN: NEGATIVE
COLOR UR AUTO: YELLOW
CREAT SERPL-MCNC: 0.73 MG/DL (ref 0.55–1.02)
EOSINOPHIL # BLD AUTO: 0.29 X10(3)/MCL (ref 0–0.9)
EOSINOPHIL NFR BLD AUTO: 2.1 %
ERYTHROCYTE [DISTWIDTH] IN BLOOD BY AUTOMATED COUNT: 13.8 % (ref 11.5–17)
FENTANYL UR QL SCN: NEGATIVE
GLOBULIN SER-MCNC: 3.7 GM/DL (ref 2.4–3.5)
GLUCOSE SERPL-MCNC: 157 MG/DL (ref 74–100)
GLUCOSE UR QL STRIP.AUTO: NEGATIVE MG/DL
HCT VFR BLD AUTO: 43.3 % (ref 37–47)
HGB BLD-MCNC: 13.8 GM/DL (ref 12–16)
IMM GRANULOCYTES # BLD AUTO: 0.08 X10(3)/MCL (ref 0–0.02)
IMM GRANULOCYTES NFR BLD AUTO: 0.6 % (ref 0–0.43)
KETONES UR QL STRIP.AUTO: NEGATIVE MG/DL
LEUKOCYTE ESTERASE UR QL STRIP.AUTO: NEGATIVE UNIT/L
LYMPHOCYTES # BLD AUTO: 3.25 X10(3)/MCL (ref 0.6–4.6)
LYMPHOCYTES NFR BLD AUTO: 23.7 %
MCH RBC QN AUTO: 29.4 PG (ref 27–31)
MCHC RBC AUTO-ENTMCNC: 31.9 MG/DL (ref 33–36)
MCV RBC AUTO: 92.3 FL (ref 80–94)
MDMA UR QL SCN: NEGATIVE
MONOCYTES # BLD AUTO: 0.91 X10(3)/MCL (ref 0.1–1.3)
MONOCYTES NFR BLD AUTO: 6.6 %
NEUTROPHILS # BLD AUTO: 9.1 X10(3)/MCL (ref 2.1–9.2)
NEUTROPHILS NFR BLD AUTO: 66.4 %
NITRITE UR QL STRIP.AUTO: NEGATIVE
NRBC BLD AUTO-RTO: 0 %
OPIATES UR QL SCN: NEGATIVE
PCP UR QL: NEGATIVE
PH UR STRIP.AUTO: 7.5 [PH]
PH UR: 7.5 [PH] (ref 3–11)
PLATELET # BLD AUTO: 341 X10(3)/MCL (ref 130–400)
PMV BLD AUTO: 10.6 FL (ref 9.4–12.4)
POCT GLUCOSE: 140 MG/DL (ref 70–110)
POTASSIUM SERPL-SCNC: 3.8 MMOL/L (ref 3.5–5.1)
PROT SERPL-MCNC: 7.2 GM/DL (ref 6.4–8.3)
PROT UR QL STRIP.AUTO: NEGATIVE MG/DL
RBC # BLD AUTO: 4.69 X10(6)/MCL (ref 4.2–5.4)
RBC #/AREA URNS AUTO: ABNORMAL /HPF
RBC UR QL AUTO: NEGATIVE UNIT/L
SODIUM SERPL-SCNC: 143 MMOL/L (ref 136–145)
SP GR UR STRIP.AUTO: 1.02
SPECIFIC GRAVITY, URINE AUTO (.000) (OHS): 1.02 (ref 1–1.03)
SQUAMOUS #/AREA URNS AUTO: ABNORMAL /LPF
UROBILINOGEN UR STRIP-ACNC: 2 MG/DL
WBC # SPEC AUTO: 13.7 X10(3)/MCL (ref 4.5–11.5)
WBC #/AREA URNS AUTO: ABNORMAL /HPF

## 2022-05-17 PROCEDURE — 80053 COMPREHEN METABOLIC PANEL: CPT | Performed by: FAMILY MEDICINE

## 2022-05-17 PROCEDURE — 80307 DRUG TEST PRSMV CHEM ANLYZR: CPT | Performed by: FAMILY MEDICINE

## 2022-05-17 PROCEDURE — 81001 URINALYSIS AUTO W/SCOPE: CPT | Performed by: FAMILY MEDICINE

## 2022-05-17 PROCEDURE — 36415 COLL VENOUS BLD VENIPUNCTURE: CPT | Performed by: FAMILY MEDICINE

## 2022-05-17 PROCEDURE — 99900031 HC PATIENT EDUCATION (STAT)

## 2022-05-17 PROCEDURE — 96361 HYDRATE IV INFUSION ADD-ON: CPT

## 2022-05-17 PROCEDURE — 63600175 PHARM REV CODE 636 W HCPCS

## 2022-05-17 PROCEDURE — 85025 COMPLETE CBC W/AUTO DIFF WBC: CPT | Performed by: FAMILY MEDICINE

## 2022-05-17 PROCEDURE — 93005 ELECTROCARDIOGRAM TRACING: CPT

## 2022-05-17 PROCEDURE — 25000003 PHARM REV CODE 250: Performed by: FAMILY MEDICINE

## 2022-05-17 PROCEDURE — 82962 GLUCOSE BLOOD TEST: CPT

## 2022-05-17 PROCEDURE — 99285 EMERGENCY DEPT VISIT HI MDM: CPT | Mod: 25

## 2022-05-17 PROCEDURE — 96374 THER/PROPH/DIAG INJ IV PUSH: CPT

## 2022-05-17 RX ORDER — ATORVASTATIN CALCIUM 20 MG/1
20 TABLET, FILM COATED ORAL
COMMUNITY
Start: 2021-12-03 | End: 2022-06-06

## 2022-05-17 RX ORDER — ESCITALOPRAM OXALATE 20 MG/1
TABLET ORAL
Status: ON HOLD | COMMUNITY
Start: 2021-12-03 | End: 2022-07-07 | Stop reason: HOSPADM

## 2022-05-17 RX ORDER — LORAZEPAM 2 MG/ML
2 INJECTION INTRAMUSCULAR
Status: COMPLETED | OUTPATIENT
Start: 2022-05-17 | End: 2022-05-17

## 2022-05-17 RX ORDER — DIVALPROEX SODIUM 500 MG/1
1500 TABLET, FILM COATED, EXTENDED RELEASE ORAL
COMMUNITY
Start: 2021-12-03

## 2022-05-17 RX ORDER — LAMOTRIGINE 100 MG/1
TABLET ORAL
COMMUNITY
Start: 2022-02-07 | End: 2022-05-31 | Stop reason: SDUPTHER

## 2022-05-17 RX ORDER — SODIUM CHLORIDE 9 MG/ML
1000 INJECTION, SOLUTION INTRAVENOUS
Status: DISCONTINUED | OUTPATIENT
Start: 2022-05-17 | End: 2022-05-17

## 2022-05-17 RX ORDER — FESOTERODINE FUMARATE 4 MG/1
TABLET, FILM COATED, EXTENDED RELEASE ORAL
COMMUNITY
Start: 2022-04-24 | End: 2022-08-30

## 2022-05-17 RX ORDER — LISINOPRIL 20 MG/1
20 TABLET ORAL
COMMUNITY
Start: 2021-12-03 | End: 2022-06-09

## 2022-05-17 RX ORDER — LORAZEPAM 2 MG/ML
INJECTION INTRAMUSCULAR
Status: COMPLETED
Start: 2022-05-17 | End: 2022-05-17

## 2022-05-17 RX ORDER — FAMOTIDINE 40 MG/1
40 TABLET, FILM COATED ORAL
COMMUNITY
Start: 2022-02-09 | End: 2022-06-03

## 2022-05-17 RX ADMIN — LORAZEPAM 2 MG: 2 INJECTION INTRAMUSCULAR; INTRAVENOUS at 04:05

## 2022-05-17 RX ADMIN — LORAZEPAM 2 MG: 2 INJECTION INTRAMUSCULAR at 04:05

## 2022-05-17 RX ADMIN — SODIUM CHLORIDE 1000 ML: 9 INJECTION, SOLUTION INTRAVENOUS at 04:05

## 2022-05-17 NOTE — ED PROVIDER NOTES
Encounter Date: 5/17/2022       History     Chief Complaint   Patient presents with    Seizures     Had seizure lasting 3 minutes at Dr. Office. Came in responsive with GCS 15, patient answering questions appropriately     This patient is a 46-year-old female who has a long history of seizures.  Daughter states that her and her mom both a row there bike from their house to the doctor's office in the 90 degree heat.  Daughter states that the mother had an episode of vomiting once they made it to the doctor's office anesthetic called her to the back her mom started having seizures.  Upon arrival to the ER the patient appears postictal and had a series of focal seizures.    The history is provided by the patient.   General Illness   The current episode started just prior to arrival. The problem occurs rarely. The problem has been unchanged. The pain is at a severity of 1/10.     Review of patient's allergies indicates:   Allergen Reactions    Codeine Nausea And Vomiting    Levetiracetam Rash     severe, irritability, pt treathen her neirghbors of cutting their children legs and killing their dogs    Meloxicam Nausea And Vomiting     Past Medical History:   Diagnosis Date    GERD (gastroesophageal reflux disease)     Hypertension     Seizures      Past Surgical History:   Procedure Laterality Date    CHOLECYSTECTOMY      HYSTERECTOMY      TONSILLECTOMY       No family history on file.  Social History     Tobacco Use    Smoking status: Never Smoker    Smokeless tobacco: Never Used   Substance Use Topics    Alcohol use: Not Currently    Drug use: Never     Review of Systems   Constitutional: Negative.    HENT: Negative.    Respiratory: Negative.    Cardiovascular: Negative.    Endocrine: Negative.    Neurological: Positive for seizures.   Psychiatric/Behavioral: Negative.    All other systems reviewed and are negative.      Physical Exam     Initial Vitals [05/17/22 1541]   BP Pulse Resp Temp SpO2   (!)  146/90 99 18 99.1 °F (37.3 °C) (!) 94 %      MAP       --         Physical Exam    Constitutional: She appears well-developed.   HENT:   Head: Normocephalic.   Eyes: Pupils are equal, round, and reactive to light.   Neck:   Normal range of motion.  Cardiovascular: Normal rate, regular rhythm and normal heart sounds.   Pulmonary/Chest: Breath sounds normal.   Abdominal: Abdomen is soft.   Musculoskeletal:         General: Normal range of motion.      Cervical back: Normal range of motion.     Neurological: She is alert and oriented to person, place, and time.   Witnessed focal seiure x 2 in ER. Pt became post-ictal afterwards, then woke up completely and felt better after   1 L fluids   Skin: Skin is warm and dry.   Psychiatric: She has a normal mood and affect.         ED Course   Procedures  Labs Reviewed   COMPREHENSIVE METABOLIC PANEL - Abnormal; Notable for the following components:       Result Value    Glucose Level 157 (*)     Blood Urea Nitrogen 6.0 (*)     Globulin 3.7 (*)     Albumin/Globulin Ratio 0.9 (*)     All other components within normal limits   URINALYSIS, REFLEX TO URINE CULTURE - Abnormal; Notable for the following components:    Urobilinogen, UA 2.0 (*)     All other components within normal limits   CBC WITH DIFFERENTIAL - Abnormal; Notable for the following components:    WBC 13.7 (*)     MCHC 31.9 (*)     IG# 0.08 (*)     IG% 0.6 (*)     All other components within normal limits   URINALYSIS, MICROSCOPIC - Abnormal; Notable for the following components:    Bacteria, UA Few (*)     Squamous Epithelial Cells, UA Few (*)     All other components within normal limits   POCT GLUCOSE - Abnormal; Notable for the following components:    POCT Glucose 140 (*)     All other components within normal limits   CBC W/ AUTO DIFFERENTIAL    Narrative:     The following orders were created for panel order CBC auto differential.  Procedure                               Abnormality         Status                      ---------                               -----------         ------                     CBC with Differential[[id number]]        Abnormal            Final result                 Please view results for these tests on the individual orders.   DRUG SCREEN PANEL, URINE EMERGENCY   POCT GLUCOSE MONITORING CONTINUOUS     EKG Readings: (Independently Interpreted)   Initial Reading: No STEMI. Rhythm: Normal Sinus Rhythm. Heart Rate: 84. Ectopy: No Ectopy. Conduction: Normal. Axis: Left Axis Deviation. Clinical Impression: Normal Sinus Rhythm     ECG Results          EKG 12-lead (In process)  Result time 05/17/22 16:27:58    In process by Interface, Lab In [facility] (05/17/22 16:27:58)                 Narrative:    Test Reason : R56.9,    Vent. Rate : 084 BPM     Atrial Rate : 084 BPM     P-R Int : 170 ms          QRS Dur : 100 ms      QT Int : 380 ms       P-R-T Axes : 049 -10 016 degrees     QTc Int : 449 ms    Normal sinus rhythm  Cannot rule out Anterior infarct ,age undetermined  Abnormal ECG  No previous ECGs available    Referred By: [email or web address]   SELF           Confirmed By:                             Imaging Results          CT Head Without Contrast (Final result)  Result time 05/17/22 16:47:32    Final result by Frederic Eaton MD (05/17/22 16:47:32)                 Impression:      No acute intracranial findings identified.      Electronically signed by: Frederic Eaton  Date:    05/17/2022  Time:    16:47             Narrative:    EXAMINATION:  CT HEAD WITHOUT CONTRAST    CLINICAL HISTORY:  Seizure disorder, clinical change;    TECHNIQUE:  Sequential axial images were performed of the brain without contrast.    Dose product length of 825 mGycm. Automated exposure control was utilized to minimize radiation dose.    COMPARISON:  MRI  October 29, 2020    FINDINGS:  There is no intracranial mass effect, midline shift, hydrocephalus or hemorrhage. There is no sulcal effacement or low attenuation changes to suggest  recent large vessel territory infarction. There is no acute extra axial fluid collection.  There is minimal mucoperiosteal thickening of the left maxillary sinus.  Otherwise, visualized paranasal sinuses are clear without mucosal thickening, polypoidal abnormality or air-fluid levels. Mastoid air cells aeration is optimal.                                 Medications   sodium chloride 0.9% bolus 1,000 mL (0 mLs Intravenous Stopped 5/17/22 1740)   lorazepam injection 2 mg (2 mg Intravenous Given 5/17/22 1652)     Medical Decision Making:   Initial Assessment:   Seizure  Differential Diagnosis:   Seizure, dehydration  Clinical Tests:   Lab Tests: Ordered and Reviewed  Radiological Study: Ordered and Reviewed  ED Management:  Patient received 2 mg Ativan in the ER after a focal seizure where she was staring out and not responsive.  Patient is feeling much better after 1 L of fluids she is awake alert oriented.  She was due to have an another dose of her seizure medicine this evening and she is requesting discharge so that she can take her meds                      Clinical Impression:   Final diagnoses:  [G40.909] Seizure disorder (Primary)  [E86.0] Dehydration, mild          ED Disposition Condition    Discharge Stable        ED Prescriptions     None        Follow-up Information     Follow up With Specialties Details Why Contact Info    Makayla Delaney MD Family Medicine Schedule an appointment as soon as possible for a visit in 2 days  [address] [facility] [address]  [phone]             Yaneli Chaudhari MD  05/17/22 4103

## 2022-05-17 NOTE — DISCHARGE INSTRUCTIONS
Rest  Increase fluid intake  Follow up with PCP in 3-4 days   Follow up with your neurologist  Stay inside in cool temperatures

## 2022-05-17 NOTE — PROGRESS NOTES
Patient presented to clinic with her daughter for her daughter's appt. Patient had an episode of vomiting then returned to [facility]. My nurse pulled daughter and Ms. Reyes back to triage daughter, Jackie, when nurse Melissa called me to come to the room because patient was having a seizure. Patient has known epilepsy and is waiting on a neuro appt. She is on depakote, valtoco spray and lamictal. Patient states they cannot see her in neuro until next year. Patient's daughter administered valtoco spray. Seizure presented for less than 3 min with mild tonic clonic movements of upper extremities. Patient did not bite tongue, urinate or defecate on herself. She is postictal but able to answer questions. She did vomit again and was able to take a few sips of water. 911 was immediately called.

## 2022-05-18 ENCOUNTER — TELEPHONE (OUTPATIENT)
Dept: FAMILY MEDICINE | Facility: CLINIC | Age: 47
End: 2022-05-18
Payer: MEDICAID

## 2022-05-18 NOTE — TELEPHONE ENCOUNTER
Please schedule her an er follow up for seizures. It would be best if she could find a ride and not ride her bike in this heat.

## 2022-05-29 ENCOUNTER — HOSPITAL ENCOUNTER (EMERGENCY)
Facility: HOSPITAL | Age: 47
Discharge: HOME | End: 2022-05-29
Attending: GENERAL PRACTICE
Payer: MEDICAID

## 2022-05-29 VITALS
BODY MASS INDEX: 40.18 KG/M2 | HEIGHT: 66 IN | RESPIRATION RATE: 18 BRPM | TEMPERATURE: 98 F | DIASTOLIC BLOOD PRESSURE: 81 MMHG | HEART RATE: 79 BPM | OXYGEN SATURATION: 95 % | WEIGHT: 250 LBS | SYSTOLIC BLOOD PRESSURE: 140 MMHG

## 2022-05-29 DIAGNOSIS — R56.9 SEIZURE-LIKE ACTIVITY: Primary | ICD-10-CM

## 2022-05-29 DIAGNOSIS — G40.919 BREAKTHROUGH SEIZURE: ICD-10-CM

## 2022-05-29 DIAGNOSIS — F12.10 CANNABIS ABUSE, EPISODIC USE: ICD-10-CM

## 2022-05-29 DIAGNOSIS — F44.5 PSEUDOSEIZURES: ICD-10-CM

## 2022-05-29 DIAGNOSIS — G40.909 SEIZURE DISORDER: ICD-10-CM

## 2022-05-29 LAB
ALBUMIN SERPL-MCNC: 3.7 GM/DL (ref 3.5–5)
ALBUMIN/GLOB SERPL: 1 RATIO (ref 1.1–2)
ALP SERPL-CCNC: 88 UNIT/L (ref 40–150)
ALT SERPL-CCNC: 21 UNIT/L (ref 0–55)
AMPHET UR QL SCN: NEGATIVE
APPEARANCE UR: CLEAR
AST SERPL-CCNC: 13 UNIT/L (ref 5–34)
B-HCG SERPL QL: NEGATIVE
BACTERIA #/AREA URNS AUTO: ABNORMAL /HPF
BARBITURATE SCN PRESENT UR: NEGATIVE
BASOPHILS # BLD AUTO: 0.06 X10(3)/MCL (ref 0–0.2)
BASOPHILS NFR BLD AUTO: 0.4 %
BENZODIAZ UR QL SCN: POSITIVE
BILIRUB UR QL STRIP.AUTO: NEGATIVE MG/DL
BILIRUBIN DIRECT+TOT PNL SERPL-MCNC: 0.4 MG/DL
BUN SERPL-MCNC: 7 MG/DL (ref 7–18.7)
CALCIUM SERPL-MCNC: 9.5 MG/DL (ref 8.4–10.2)
CANNABINOIDS UR QL SCN: POSITIVE
CHLORIDE SERPL-SCNC: 105 MMOL/L (ref 98–107)
CK MB SERPL-MCNC: 0.4 NG/ML
CK SERPL-CCNC: 37 U/L (ref 29–168)
CO2 SERPL-SCNC: 26 MMOL/L (ref 22–29)
COCAINE UR QL SCN: NEGATIVE
COLOR UR AUTO: YELLOW
CREAT SERPL-MCNC: 0.83 MG/DL (ref 0.55–1.02)
EOSINOPHIL # BLD AUTO: 0.23 X10(3)/MCL (ref 0–0.9)
EOSINOPHIL NFR BLD AUTO: 1.7 %
ERYTHROCYTE [DISTWIDTH] IN BLOOD BY AUTOMATED COUNT: 13.7 % (ref 11.5–17)
FENTANYL UR QL SCN: NEGATIVE
GLOBULIN SER-MCNC: 3.7 GM/DL (ref 2.4–3.5)
GLUCOSE SERPL-MCNC: 106 MG/DL (ref 74–100)
GLUCOSE UR QL STRIP.AUTO: NEGATIVE MG/DL
HCT VFR BLD AUTO: 43.4 % (ref 37–47)
HGB BLD-MCNC: 13.7 GM/DL (ref 12–16)
IMM GRANULOCYTES # BLD AUTO: 0.08 X10(3)/MCL (ref 0–0.02)
IMM GRANULOCYTES NFR BLD AUTO: 0.6 % (ref 0–0.43)
KETONES UR QL STRIP.AUTO: ABNORMAL MG/DL
LACTATE SERPL-SCNC: 1.6 MMOL/L (ref 0.5–2.2)
LEUKOCYTE ESTERASE UR QL STRIP.AUTO: NEGATIVE UNIT/L
LYMPHOCYTES # BLD AUTO: 3.98 X10(3)/MCL (ref 0.6–4.6)
LYMPHOCYTES NFR BLD AUTO: 29.3 %
MAGNESIUM SERPL-MCNC: 2 MG/DL (ref 1.6–2.6)
MCH RBC QN AUTO: 29.1 PG (ref 27–31)
MCHC RBC AUTO-ENTMCNC: 31.6 MG/DL (ref 33–36)
MCV RBC AUTO: 92.1 FL (ref 80–94)
MDMA UR QL SCN: NEGATIVE
MONOCYTES # BLD AUTO: 0.92 X10(3)/MCL (ref 0.1–1.3)
MONOCYTES NFR BLD AUTO: 6.8 %
MUCOUS THREADS URNS QL MICRO: ABNORMAL /LPF
NEUTROPHILS # BLD AUTO: 8.3 X10(3)/MCL (ref 2.1–9.2)
NEUTROPHILS NFR BLD AUTO: 61.2 %
NITRITE UR QL STRIP.AUTO: NEGATIVE
NRBC BLD AUTO-RTO: 0 %
OPIATES UR QL SCN: NEGATIVE
PCP UR QL: NEGATIVE
PH UR STRIP.AUTO: 6 [PH]
PH UR: 6 [PH] (ref 3–11)
PLATELET # BLD AUTO: 310 X10(3)/MCL (ref 130–400)
PMV BLD AUTO: 11 FL (ref 9.4–12.4)
POTASSIUM SERPL-SCNC: 4.2 MMOL/L (ref 3.5–5.1)
PROT SERPL-MCNC: 7.4 GM/DL (ref 6.4–8.3)
PROT UR QL STRIP.AUTO: ABNORMAL MG/DL
RBC # BLD AUTO: 4.71 X10(6)/MCL (ref 4.2–5.4)
RBC #/AREA URNS AUTO: ABNORMAL /HPF
RBC UR QL AUTO: NEGATIVE UNIT/L
SARS-COV-2 RDRP RESP QL NAA+PROBE: NEGATIVE
SODIUM SERPL-SCNC: 143 MMOL/L (ref 136–145)
SP GR UR STRIP.AUTO: >=1.03
SPECIFIC GRAVITY, URINE AUTO (.000) (OHS): >=1.03 (ref 1–1.03)
SQUAMOUS #/AREA URNS AUTO: ABNORMAL /LPF
TROPONIN I SERPL-MCNC: <0.01 NG/ML (ref 0–0.04)
UROBILINOGEN UR STRIP-ACNC: 1 MG/DL
VALPROATE SERPL-MCNC: 46.1 UG/ML (ref 50–100)
WBC # SPEC AUTO: 13.6 X10(3)/MCL (ref 4.5–11.5)
WBC #/AREA URNS AUTO: ABNORMAL /HPF

## 2022-05-29 PROCEDURE — 87635 SARS-COV-2 COVID-19 AMP PRB: CPT | Performed by: GENERAL PRACTICE

## 2022-05-29 PROCEDURE — 80307 DRUG TEST PRSMV CHEM ANLYZR: CPT | Performed by: GENERAL PRACTICE

## 2022-05-29 PROCEDURE — 85025 COMPLETE CBC W/AUTO DIFF WBC: CPT | Performed by: GENERAL PRACTICE

## 2022-05-29 PROCEDURE — 99285 EMERGENCY DEPT VISIT HI MDM: CPT | Mod: 25

## 2022-05-29 PROCEDURE — 36415 COLL VENOUS BLD VENIPUNCTURE: CPT | Performed by: GENERAL PRACTICE

## 2022-05-29 PROCEDURE — 82553 CREATINE MB FRACTION: CPT | Performed by: GENERAL PRACTICE

## 2022-05-29 PROCEDURE — 93005 ELECTROCARDIOGRAM TRACING: CPT

## 2022-05-29 PROCEDURE — 83735 ASSAY OF MAGNESIUM: CPT | Performed by: GENERAL PRACTICE

## 2022-05-29 PROCEDURE — 96361 HYDRATE IV INFUSION ADD-ON: CPT

## 2022-05-29 PROCEDURE — 96365 THER/PROPH/DIAG IV INF INIT: CPT

## 2022-05-29 PROCEDURE — 80053 COMPREHEN METABOLIC PANEL: CPT | Performed by: GENERAL PRACTICE

## 2022-05-29 PROCEDURE — 83605 ASSAY OF LACTIC ACID: CPT | Performed by: GENERAL PRACTICE

## 2022-05-29 PROCEDURE — 25000003 PHARM REV CODE 250: Performed by: GENERAL PRACTICE

## 2022-05-29 PROCEDURE — 81001 URINALYSIS AUTO W/SCOPE: CPT | Mod: 59 | Performed by: GENERAL PRACTICE

## 2022-05-29 PROCEDURE — 82550 ASSAY OF CK (CPK): CPT | Performed by: GENERAL PRACTICE

## 2022-05-29 PROCEDURE — 84484 ASSAY OF TROPONIN QUANT: CPT | Performed by: GENERAL PRACTICE

## 2022-05-29 PROCEDURE — 81025 URINE PREGNANCY TEST: CPT | Performed by: GENERAL PRACTICE

## 2022-05-29 PROCEDURE — 63600175 PHARM REV CODE 636 W HCPCS: Performed by: GENERAL PRACTICE

## 2022-05-29 PROCEDURE — 80164 ASSAY DIPROPYLACETIC ACD TOT: CPT | Performed by: GENERAL PRACTICE

## 2022-05-29 RX ORDER — DIVALPROEX SODIUM 250 MG/1
250 TABLET, DELAYED RELEASE ORAL
Status: COMPLETED | OUTPATIENT
Start: 2022-05-29 | End: 2022-05-29

## 2022-05-29 RX ADMIN — SODIUM CHLORIDE 1000 ML: 9 INJECTION, SOLUTION INTRAVENOUS at 06:05

## 2022-05-29 RX ADMIN — DIVALPROEX SODIUM 250 MG: 250 TABLET, DELAYED RELEASE ORAL at 09:05

## 2022-05-29 RX ADMIN — DEXTROSE MONOHYDRATE 1000 MG PE: 50 INJECTION, SOLUTION INTRAVENOUS at 06:05

## 2022-05-29 NOTE — ED TRIAGE NOTES
Pt arrived via stretcher with [facility], was advised pt had several focal sz. Pt has a hx of sz. Daughter at bedside she stated pt has been taking her medication as rx. NAD noted. Pt responding approp.

## 2022-05-29 NOTE — ED NOTES
Patient came in after having multiple focal seizures at home and in [facility] on way here. Patient responds to pain, but not verbal stimuli. Patient very fearful and disorientated after focal seizure. Hx of seizures noted. Family states she has been taking her medication appropriately

## 2022-05-29 NOTE — ED PROVIDER NOTES
Encounter Date: 5/29/2022       History     Chief Complaint   Patient presents with    Seizures     Patient has multiple seizures before arrival. Focal seizures noted per [facility].     Patient has multiple seizures before arrival. Focal seizures noted per [facility].    The history is provided by the EMS personnel and a relative.     Review of patient's allergies indicates:   Allergen Reactions    Codeine Nausea And Vomiting    Levetiracetam Rash     severe, irritability, pt treathen her neirghbors of cutting their children legs and killing their dogs    Meloxicam Nausea And Vomiting     Past Medical History:   Diagnosis Date    GERD (gastroesophageal reflux disease)     Hypertension     Seizures      Past Surgical History:   Procedure Laterality Date    CHOLECYSTECTOMY      HYSTERECTOMY      TONSILLECTOMY       History reviewed. No pertinent family history.  Social History     Tobacco Use    Smoking status: Never Smoker    Smokeless tobacco: Never Used   Substance Use Topics    Alcohol use: Not Currently    Drug use: Never     Review of Systems   Constitutional: Negative.  Negative for appetite change and chills.   HENT: Negative.    Eyes: Negative.    Respiratory: Negative.    Cardiovascular: Negative.    Gastrointestinal: Negative.    Endocrine: Negative.    Genitourinary: Negative.    Musculoskeletal: Negative.    Skin: Negative.    Allergic/Immunologic: Negative.    Neurological: Positive for tremors, seizures and weakness.   Hematological: Negative.    Psychiatric/Behavioral: Negative.    All other systems reviewed and are negative.      Physical Exam     Initial Vitals [05/29/22 1807]   BP Pulse Resp Temp SpO2   (!) 163/96 102 20 98.1 °F (36.7 °C) (!) 92 %      MAP       --         Physical Exam    Nursing note and vitals reviewed.  Constitutional: She appears well-developed and well-nourished.   HENT:   Head: Normocephalic and atraumatic.   Eyes: EOM are normal. Pupils are equal, round, and reactive to  light.   Neck: Neck supple.   Normal range of motion.  Cardiovascular: Normal rate, regular rhythm, normal heart sounds and intact distal pulses.   Pulmonary/Chest: Breath sounds normal.   Abdominal: Abdomen is soft. Bowel sounds are normal.   Musculoskeletal:         General: Normal range of motion.      Cervical back: Normal range of motion and neck supple.     Neurological: She is alert and oriented to person, place, and time. GCS score is 15. GCS eye subscore is 4. GCS verbal subscore is 5. GCS motor subscore is 6.   Skin: Skin is warm and dry.   Psychiatric: She has a normal mood and affect. Her behavior is normal. Judgment and thought content normal.         ED Course   Procedures  Labs Reviewed   URINALYSIS, REFLEX TO URINE CULTURE - Abnormal; Notable for the following components:       Result Value    Protein, UA Trace (*)     Ketones, UA Trace (*)     All other components within normal limits   DRUG SCREEN, URINE (BEAKER) - Abnormal; Notable for the following components:    Benzodiazepine, Urine Positive (*)     Cannabinoids, Urine Positive (*)     All other components within normal limits   COMPREHENSIVE METABOLIC PANEL - Abnormal; Notable for the following components:    Glucose Level 106 (*)     Globulin 3.7 (*)     Albumin/Globulin Ratio 1.0 (*)     All other components within normal limits   VALPROIC ACID - Abnormal; Notable for the following components:    Valproic Acid Level 46.1 (*)     All other components within normal limits   CBC WITH DIFFERENTIAL - Abnormal; Notable for the following components:    WBC 13.6 (*)     MCHC 31.6 (*)     IG# 0.08 (*)     IG% 0.6 (*)     All other components within normal limits   URINALYSIS, MICROSCOPIC - Abnormal; Notable for the following components:    Mucous, UA Trace (*)     Squamous Epithelial Cells, UA Many (*)     All other components within normal limits   HCG QUALITATIVE URINE - Normal   SARS-COV-2 RNA AMPLIFICATION, QUAL - Normal   MAGNESIUM - Normal    LACTIC ACID, PLASMA - Normal   TROPONIN I - Normal   CK-MB - Normal   CK - Normal   CBC W/ AUTO DIFFERENTIAL    Narrative:     The following orders were created for panel order CBC auto differential.  Procedure                               Abnormality         Status                     ---------                               -----------         ------                     CBC with Differential[[id number]]        Abnormal            Final result                 Please view results for these tests on the individual orders.     EKG Readings: (Independently Interpreted)   Rhythm: Normal Sinus Rhythm. Heart Rate: 99. Axis: Normal. Other Impression: left atrial enlargement       Imaging Results          CT Head Without Contrast (Final result)  Result time 05/29/22 21:10:51    Final result by Kameron Harvey MD (05/29/22 21:10:51)                 Impression:        1. No convincing acute intracranial abnormality.    2. Additional secondary details discussed above, relatively unchanged from the 17 May 2022 appearance.      Electronically signed by: Kameron Harvey  Date:    05/29/2022  Time:    21:10             Narrative:    EXAMINATION:  CT HEAD WITHOUT CONTRAST    CLINICAL HISTORY:  ; epilepsy with multiple seizures today;    TECHNIQUE:  Axial CT images were acquired without the intravenous administration of iodine-based contrast media.  Multiplanar reconstructions were accomplished by a CT technologist at a separate workstation and pushed to PACS for physician review.    Automated tube current modulation, weight-based exposure dosing, and/or iterative reconstruction technique utilized to reach lowest reasonably achievable exposure rate.    DLP: 945 mGy*cm    COMPARISON:  17 May 2022    FINDINGS:  Images were reviewed in subdural, brain, soft tissue, and bone windows.    Exam quality: Motion/streak artifact limits assessment of the posterior fossa.    No evidence of hyperdense intracranial findings to suggest acute  hemorrhage.  Gray-white differentiation is maintained.  There is no new or worsening extra-axial fluid collection, mass effect, or midline shift.  No hydrocephalus.    No evidence of hyperdense artery or vein.  Minimal burden of bilateral carotid siphon mural calcification is unchanged.    Visualized osseous structures, maxillofacial components, and extracranial soft tissues are unchanged.                                 CT Chest Without Contrast (Final result)  Result time 05/29/22 21:00:27    Final result by Kameron Harvey MD (05/29/22 21:00:27)                 Impression:        1. No acute intrathoracic abnormality.  2. Chronic secondary details discussed above.      Electronically signed by: Kameron Harvey  Date:    05/29/2022  Time:    21:00             Narrative:    EXAMINATION:  CT CHEST WITHOUT CONTRAST    CLINICAL HISTORY:  ; Sepsis;    TECHNIQUE:  Helical-acquisition CT images were obtained without the intravenous administration of iodine-based contrast media.  Multiplanar reformats were accomplished by a CT technologist at a separate workstation and pushed to PACS for physician review.    Automated tube current modulation, weight-based exposure dosing, and/or iterative reconstruction technique utilized to reach lowest reasonably achievable exposure rate.    DLP: 421 mGy*cm    COMPARISON:  None available at the time of initial interpretation.    FINDINGS:  Images were reviewed in lung, soft tissue, and bone windows.    Exam quality: adequate for evaluation    Lines/tubes: none visualized    Heart chambers, aorta, and pulmonary vasculature are unremarkable for noncontrast CT assessment.  Widespread coronary calcification is noted.  There is no pericardial effusion.    Central airway patency is widely maintained.  No organized airspace consolidation or suspicious focal lung lesion.  There is no pleural effusion or evidence of pneumothorax.    No pathologic intrathoracic marquez enlargement or necrotic  adenopathy.    The partially visualized thyroid gland, upper abdominal structures, and musculoskeletal components are without acute or suspicious focal abnormality.  Widespread spinal column degenerative changes are present.                                   Medications   divalproex EC tablet 250 mg (has no administration in time range)   FOSphenytoin (CEREBYX) 1,000 mg PE in dextrose 5 % 100 mL IVPB (0 mg PE/kg × 100 kg (Order-Specific) Intravenous Stopped 5/29/22 1918)   sodium chloride 0.9% bolus 1,000 mL (0 mLs Intravenous Stopped 5/29/22 1959)     Medical Decision Making:   Initial Assessment:   Upon arrival, patient appeared to be postictal however with a very good sternal rub, the patient did respond and looked at me.  She started to respond to questions.  Per EMS she had some focal seizures prior to arrival in the emergency room and the patient's daughter reports grand mal seizures at home.  Clinical Tests:   Lab Tests: Ordered and Reviewed  Radiological Study: Ordered and Reviewed  Medical Tests: Ordered and Reviewed  ED Management:  Valproic acid is a little low, so I informed the patient follow-up with her neurologist to regulate this level.  While here the patient had multiple seizures.  While in CT, the tech was concerned that the patient was having seizure however with a good sternal rub the patient's seizure stopped.  And she was immediately able to turn herself over onto the stretcher from the CT bed.  While in the room, patient again had a number of these episodes, but they are easily broken with movement of the arms a sternal rub and did not result in any postictal phase.  I am sure the patient does have a seizure disorder, however I believe that these recent activity here in the Emergency Room represent pseudoseizures.  I advised the patient follow-up with her primary care physician or her neurologist for medication changes.  It is likely that the Depakote needs to be increased.  She also  needs to get levels of her Trileptal IM as well as other anti seizure medications.  There is nothing emergent noted here on either exam or laboratory workup.  Urine drug screen is positive for benzos and cannabis but she was given Versed by the EMS crew prior to arrival.  I advised her to avoid cannabis at this time as it may have contaminants which may make her seizure activity worse.  Again it appears that the patient is having pseudoseizures here in the ER and it is likely safe to discharge her home.  Patient does have a follow-up with Neurology in July.  I informed the daughter to call the neurologist and formed in the the patient has had multiple visits to the emergency room and to see if the appointment can be pushed up to accommodate the patient.                      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 [facility] [address]  [phone]             Elvis Patton MD  05/29/22 0878

## 2022-05-30 ENCOUNTER — TELEPHONE (OUTPATIENT)
Dept: NEUROLOGY | Facility: CLINIC | Age: 47
End: 2022-05-30
Payer: MEDICAID

## 2022-05-30 NOTE — ED NOTES
"Daughter called staff into room stating "my mom is having a seizure". Upon entering room pt is able to adjust self, blinking eyes, no change in vitals. MD notified.   "

## 2022-05-30 NOTE — TELEPHONE ENCOUNTER
----- Message from Haydee Nelson sent at 2022  6:32 AM CDT -----  Regarding: med refill  CallType: Patient Call  To: Ofc When in   From: Helen MALHOTRA [facility]   Phone: [phone]   Patient name: Amy Driscoll   : 1975   Reg Dr: Dr Awais Alexander   Ref: medication refills    Clr ID: [id number]    --------------------------------------  Message History  Account: [id number]  Taken:  Fri 27-May-2022 12:54p CDM  Serial#: [id number]      
Returned Helen's call, lmom requesting a return call.  
unknown

## 2022-05-30 NOTE — ED NOTES
"Called to ct for "seizure activity" not responding to  Verbal stimuli, responded to dr izquierdo sternal rub, woke up and then rolled off ct table onto stretcher. As commanded. Back to room2, report to   "

## 2022-06-01 RX ORDER — LAMOTRIGINE 100 MG/1
TABLET ORAL
Qty: 60 TABLET | Refills: 2 | Status: SHIPPED | OUTPATIENT
Start: 2022-06-01 | End: 2022-06-03

## 2022-06-02 ENCOUNTER — TELEPHONE (OUTPATIENT)
Dept: FAMILY MEDICINE | Facility: CLINIC | Age: 47
End: 2022-06-02
Payer: MEDICAID

## 2022-06-02 DIAGNOSIS — Z00.00 WELLNESS EXAMINATION: Primary | ICD-10-CM

## 2022-06-02 DIAGNOSIS — E11.40 TYPE 2 DIABETES MELLITUS WITH DIABETIC NEUROPATHY, WITHOUT LONG-TERM CURRENT USE OF INSULIN: ICD-10-CM

## 2022-06-02 NOTE — TELEPHONE ENCOUNTER
----- Message from Maxine Xiong sent at 6/1/2022  1:11 PM CDT -----  Regarding: Advice  Type:  Needs Medical Advice    Who Called: Patient  Symptoms (please be specific): was in the hospital couple days ago and her BP was 194/114, she had a seizure  How long has patient had these symptoms:    Pharmacy name and phone #:  [facility] in [place]  Would the patient rather a call back or a response via [facility]?   Best Call Back Number: [phone]  Additional Information: Would like to discuss her options for care. She does not know if its still high or not, she has not checked.

## 2022-06-02 NOTE — TELEPHONE ENCOUNTER
Please contact patient to schedule an appointment. Also remind patient to find a ride, do not ride her bike here and exhaust herself due to it causing her seizures.

## 2022-06-03 ENCOUNTER — OFFICE VISIT (OUTPATIENT)
Dept: FAMILY MEDICINE | Facility: CLINIC | Age: 47
End: 2022-06-03
Payer: MEDICAID

## 2022-06-03 VITALS
HEIGHT: 66 IN | TEMPERATURE: 98 F | OXYGEN SATURATION: 98 % | RESPIRATION RATE: 20 BRPM | HEART RATE: 74 BPM | BODY MASS INDEX: 40.92 KG/M2 | WEIGHT: 254.63 LBS | SYSTOLIC BLOOD PRESSURE: 138 MMHG | DIASTOLIC BLOOD PRESSURE: 85 MMHG

## 2022-06-03 DIAGNOSIS — G40.B09 NONINTRACTABLE JUVENILE MYOCLONIC EPILEPSY WITHOUT STATUS EPILEPTICUS: Primary | ICD-10-CM

## 2022-06-03 DIAGNOSIS — D72.829 LEUKOCYTOSIS, UNSPECIFIED TYPE: ICD-10-CM

## 2022-06-03 PROCEDURE — 1159F MED LIST DOCD IN RCRD: CPT | Mod: CPTII,,, | Performed by: FAMILY MEDICINE

## 2022-06-03 PROCEDURE — 3075F PR MOST RECENT SYSTOLIC BLOOD PRESS GE 130-139MM HG: ICD-10-PCS | Mod: CPTII,,, | Performed by: FAMILY MEDICINE

## 2022-06-03 PROCEDURE — 99214 PR OFFICE/OUTPT VISIT, EST, LEVL IV, 30-39 MIN: ICD-10-PCS | Mod: ,,, | Performed by: FAMILY MEDICINE

## 2022-06-03 PROCEDURE — 1160F PR REVIEW ALL MEDS BY PRESCRIBER/CLIN PHARMACIST DOCUMENTED: ICD-10-PCS | Mod: CPTII,,, | Performed by: FAMILY MEDICINE

## 2022-06-03 PROCEDURE — 3079F PR MOST RECENT DIASTOLIC BLOOD PRESSURE 80-89 MM HG: ICD-10-PCS | Mod: CPTII,,, | Performed by: FAMILY MEDICINE

## 2022-06-03 PROCEDURE — 1159F PR MEDICATION LIST DOCUMENTED IN MEDICAL RECORD: ICD-10-PCS | Mod: CPTII,,, | Performed by: FAMILY MEDICINE

## 2022-06-03 PROCEDURE — 4010F PR ACE/ARB THEARPY RXD/TAKEN: ICD-10-PCS | Mod: CPTII,,, | Performed by: FAMILY MEDICINE

## 2022-06-03 PROCEDURE — 1160F RVW MEDS BY RX/DR IN RCRD: CPT | Mod: CPTII,,, | Performed by: FAMILY MEDICINE

## 2022-06-03 PROCEDURE — 3008F BODY MASS INDEX DOCD: CPT | Mod: CPTII,,, | Performed by: FAMILY MEDICINE

## 2022-06-03 PROCEDURE — 4010F ACE/ARB THERAPY RXD/TAKEN: CPT | Mod: CPTII,,, | Performed by: FAMILY MEDICINE

## 2022-06-03 PROCEDURE — 3008F PR BODY MASS INDEX (BMI) DOCUMENTED: ICD-10-PCS | Mod: CPTII,,, | Performed by: FAMILY MEDICINE

## 2022-06-03 PROCEDURE — 99214 OFFICE O/P EST MOD 30 MIN: CPT | Mod: ,,, | Performed by: FAMILY MEDICINE

## 2022-06-03 PROCEDURE — 3079F DIAST BP 80-89 MM HG: CPT | Mod: CPTII,,, | Performed by: FAMILY MEDICINE

## 2022-06-03 PROCEDURE — 3075F SYST BP GE 130 - 139MM HG: CPT | Mod: CPTII,,, | Performed by: FAMILY MEDICINE

## 2022-06-03 RX ORDER — LAMOTRIGINE 200 MG/1
200 TABLET ORAL DAILY
Qty: 30 TABLET | Refills: 11 | Status: SHIPPED | OUTPATIENT
Start: 2022-06-03 | End: 2022-06-08

## 2022-06-03 NOTE — TELEPHONE ENCOUNTER
Please request most recent eye exam from evgeny benjamin, but we do not think it was a diabetic eye exam. thanks

## 2022-06-03 NOTE — PROGRESS NOTES
Amy VASQUEZ Americo  06/03/2022  [id number]    Makayla Do MD  Patient Care Team:  Makayla Delaney MD as PCP - General (Family Medicine)      Chief Complaint:  Chief Complaint   Patient presents with    Follow-up     ED f/u seizures       History of Present Illness:  HPI    46 y.o. female who presents today for multiple ER visits for uncontrolled epilepsy. Her neurologist moved and she is unable to get an appt with a new neurologist until July. CT brain has been normal on both occasions. She states she is compliant with meds. ER doctor documented that patient was arousable during seizures with sternal rub so he is also concerned for pseudoseizures. She is on depakote 1500 mg daily, lamictal 100 mg bid and valtoco spray. Over the past month, she has had two seizures. Prior to this, she was seizure free for only two months. She c/o occasional headaches and blurred vision. She does have a pituitary adenoma that she follows with neurosurgery for. She is utd with vision exam, which was abnormal but patient is unsure what was abnormal about. Labs also reveal chronically elevated wbc. She has recently quit smoking within the past week.     Review of Systems  General: denies f/c, weight loss, night sweats, decreased appetite  Eye: denies blurred vision, changes in vision  Respiratory: denies sob, wheezing, cough  Cardiovascular: denies chest pain, palpitations, edema  Gastrointestinal: denies abdominal pain, n/v, constipation, diarrhea  Integumentary: denies rashes, pruritis        Health Maintenance  Health Maintenance Topics with due status: Not Due       Topic Last Completion Date    TETANUS VACCINE 08/02/2018    Influenza Vaccine 10/12/2020     Health Maintenance Due   Topic Date Due    Hepatitis C Screening  Never done    HIV Screening  Never done    Pneumococcal Vaccines (Age 0-64) (2 - PCV) 08/02/2019    Mammogram  07/16/2021    COVID-19 Vaccine (3 - Booster for Adelaide series) 04/15/2022        Exam:  Vitals:    06/03/22 0855   BP: 138/85   Pulse: 74   Resp: 20   Temp: 97.7 °F (36.5 °C)     Weight: 115.5 kg (254 lb 10.1 oz)   Body mass index is 41.41 kg/m².      Physical Exam  Constitutional: NAD, alert, pleasant  Respiratory: CTAB, no wheezes, rales or rhonchi. No accessory muscle use  Eyes: EOMI  Cardiovascular: RRR, No m/r/g. No JVD. No LE edema  Gastrointestinal: BS+, nontender, nondistended  Integumentary: warm, dry, intact  Psych: AA&Ox3        1. 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 Leukocytosis, unspecified type  -     lamoTRIgine (LAMICTAL) 200 MG tablet  -     Path Review, Peripheral Smear  -     Protein Electrophoresis, Serum, w/Interp  -     Vitamin B12       Will increase lamictal to 200 mg bid. Keep appt for July with neurologist  Will get peripheral smear, b12 and spep for leukocytosis  rtc next week with labs  Follow up: No follow-ups on file.      Care Plan/Goals: Reviewed   Goals    None

## 2022-06-07 DIAGNOSIS — D72.829 LEUKOCYTOSIS, UNSPECIFIED TYPE: ICD-10-CM

## 2022-06-08 ENCOUNTER — LAB VISIT (OUTPATIENT)
Dept: LAB | Facility: HOSPITAL | Age: 47
End: 2022-06-08
Attending: FAMILY MEDICINE
Payer: MEDICAID

## 2022-06-08 DIAGNOSIS — E11.40 TYPE 2 DIABETES MELLITUS WITH DIABETIC NEUROPATHY, WITHOUT LONG-TERM CURRENT USE OF INSULIN: ICD-10-CM

## 2022-06-08 DIAGNOSIS — Z00.00 WELLNESS EXAMINATION: ICD-10-CM

## 2022-06-08 DIAGNOSIS — D72.829 LEUKOCYTOSIS, UNSPECIFIED TYPE: ICD-10-CM

## 2022-06-08 LAB
ALBUMIN SERPL-MCNC: 3.3 GM/DL (ref 3.5–5)
ALBUMIN/GLOB SERPL: 0.9 RATIO (ref 1.1–2)
ALP SERPL-CCNC: 70 UNIT/L (ref 40–150)
ALT SERPL-CCNC: 23 UNIT/L (ref 0–55)
APPEARANCE UR: CLEAR
AST SERPL-CCNC: 14 UNIT/L (ref 5–34)
BACTERIA #/AREA URNS AUTO: ABNORMAL /HPF
BASOPHILS # BLD AUTO: 0.1 X10(3)/MCL (ref 0–0.2)
BASOPHILS NFR BLD AUTO: 0.8 %
BILIRUB UR QL STRIP.AUTO: NEGATIVE MG/DL
BILIRUBIN DIRECT+TOT PNL SERPL-MCNC: 0.3 MG/DL
BUN SERPL-MCNC: 7 MG/DL (ref 7–18.7)
CALCIUM SERPL-MCNC: 9.4 MG/DL (ref 8.4–10.2)
CHLORIDE SERPL-SCNC: 107 MMOL/L (ref 98–107)
CHOLEST SERPL-MCNC: 136 MG/DL
CHOLEST/HDLC SERPL: 4 {RATIO} (ref 0–5)
CO2 SERPL-SCNC: 26 MMOL/L (ref 22–29)
COLOR UR AUTO: YELLOW
CREAT SERPL-MCNC: 0.66 MG/DL (ref 0.55–1.02)
CREAT UR-MCNC: 144.4 MG/DL (ref 47–110)
DEPRECATED CALCIDIOL+CALCIFEROL SERPL-MC: 53 NG/ML (ref 30–80)
EOSINOPHIL # BLD AUTO: 0.31 X10(3)/MCL (ref 0–0.9)
EOSINOPHIL NFR BLD AUTO: 2.4 %
ERYTHROCYTE [DISTWIDTH] IN BLOOD BY AUTOMATED COUNT: 14.3 % (ref 11.5–17)
EST. AVERAGE GLUCOSE BLD GHB EST-MCNC: 119.8 MG/DL
GLOBULIN SER-MCNC: 3.8 GM/DL (ref 2.4–3.5)
GLUCOSE SERPL-MCNC: 134 MG/DL (ref 74–100)
GLUCOSE UR QL STRIP.AUTO: NEGATIVE MG/DL
HBA1C MFR BLD: 5.8 %
HCT VFR BLD AUTO: 41.9 % (ref 37–47)
HDLC SERPL-MCNC: 33 MG/DL (ref 35–60)
HGB BLD-MCNC: 13.8 GM/DL (ref 12–16)
IMM GRANULOCYTES # BLD AUTO: 0.1 X10(3)/MCL (ref 0–0.02)
IMM GRANULOCYTES NFR BLD AUTO: 0.8 % (ref 0–0.43)
KETONES UR QL STRIP.AUTO: NEGATIVE MG/DL
LDLC SERPL CALC-MCNC: 89 MG/DL (ref 50–140)
LEUKOCYTE ESTERASE UR QL STRIP.AUTO: NEGATIVE UNIT/L
LYMPHOCYTES # BLD AUTO: 4.21 X10(3)/MCL (ref 0.6–4.6)
LYMPHOCYTES NFR BLD AUTO: 33.2 %
MCH RBC QN AUTO: 30.1 PG (ref 27–31)
MCHC RBC AUTO-ENTMCNC: 32.9 MG/DL (ref 33–36)
MCV RBC AUTO: 91.5 FL (ref 80–94)
MICROALBUMIN UR-MCNC: 7.5 UG/ML
MICROALBUMIN/CREAT RATIO PNL UR: 5.2 MG/GM CR (ref 0–30)
MONOCYTES # BLD AUTO: 0.89 X10(3)/MCL (ref 0.1–1.3)
MONOCYTES NFR BLD AUTO: 7 %
NEUTROPHILS # BLD AUTO: 7.1 X10(3)/MCL (ref 2.1–9.2)
NEUTROPHILS NFR BLD AUTO: 55.8 %
NITRITE UR QL STRIP.AUTO: NEGATIVE
NRBC BLD AUTO-RTO: 0 %
PH UR STRIP.AUTO: 5 [PH]
PLATELET # BLD AUTO: 304 X10(3)/MCL (ref 130–400)
PMV BLD AUTO: 11.2 FL (ref 9.4–12.4)
POTASSIUM SERPL-SCNC: 4.1 MMOL/L (ref 3.5–5.1)
PROT SERPL-MCNC: 7.1 GM/DL (ref 6.4–8.3)
PROT UR QL STRIP.AUTO: NEGATIVE MG/DL
RBC # BLD AUTO: 4.58 X10(6)/MCL (ref 4.2–5.4)
RBC #/AREA URNS AUTO: ABNORMAL /HPF
RBC UR QL AUTO: NEGATIVE UNIT/L
SODIUM SERPL-SCNC: 143 MMOL/L (ref 136–145)
SP GR UR STRIP.AUTO: >=1.03
SQUAMOUS #/AREA URNS AUTO: ABNORMAL /LPF
TRIGL SERPL-MCNC: 69 MG/DL (ref 37–140)
UROBILINOGEN UR STRIP-ACNC: 0.2 MG/DL
VIT B12 SERPL-MCNC: 549 PG/ML (ref 213–816)
VLDLC SERPL CALC-MCNC: 14 MG/DL
WBC # SPEC AUTO: 12.7 X10(3)/MCL (ref 4.5–11.5)
WBC #/AREA URNS AUTO: ABNORMAL /HPF

## 2022-06-08 PROCEDURE — 81001 URINALYSIS AUTO W/SCOPE: CPT

## 2022-06-08 PROCEDURE — 80053 COMPREHEN METABOLIC PANEL: CPT

## 2022-06-08 PROCEDURE — 80061 LIPID PANEL: CPT

## 2022-06-08 PROCEDURE — 82607 VITAMIN B-12: CPT

## 2022-06-08 PROCEDURE — 85025 COMPLETE CBC W/AUTO DIFF WBC: CPT

## 2022-06-08 PROCEDURE — 36415 COLL VENOUS BLD VENIPUNCTURE: CPT

## 2022-06-08 PROCEDURE — 85060 BLOOD SMEAR INTERPRETATION: CPT

## 2022-06-08 PROCEDURE — 82306 VITAMIN D 25 HYDROXY: CPT

## 2022-06-08 PROCEDURE — 82043 UR ALBUMIN QUANTITATIVE: CPT

## 2022-06-08 PROCEDURE — 83036 HEMOGLOBIN GLYCOSYLATED A1C: CPT

## 2022-06-08 PROCEDURE — 84165 PROTEIN E-PHORESIS SERUM: CPT

## 2022-06-08 RX ORDER — LAMOTRIGINE 200 MG/1
200 TABLET ORAL 2 TIMES DAILY
Qty: 30 TABLET | Refills: 1 | Status: SHIPPED | OUTPATIENT
Start: 2022-06-08 | End: 2022-06-28 | Stop reason: SDUPTHER

## 2022-06-08 NOTE — TELEPHONE ENCOUNTER
----- Message from Varsha Briscoeer sent at 6/6/2022  2:16 PM CDT -----  Regarding: Advice  Type:  Needs Medical Advice    Who Called: Patient  Would the patient rather a call back or a response via [facility]? Call  Best Call Back Number: [phone]  Additional Information: Patient would like a call from Melissa in regards to her medications. She needs clarification. Pt states she does not need to refill. Please Advise.

## 2022-06-09 ENCOUNTER — OFFICE VISIT (OUTPATIENT)
Dept: FAMILY MEDICINE | Facility: CLINIC | Age: 47
End: 2022-06-09
Payer: MEDICAID

## 2022-06-09 VITALS
HEIGHT: 66 IN | BODY MASS INDEX: 41.25 KG/M2 | WEIGHT: 256.63 LBS | RESPIRATION RATE: 18 BRPM | TEMPERATURE: 97 F | OXYGEN SATURATION: 98 % | SYSTOLIC BLOOD PRESSURE: 122 MMHG | DIASTOLIC BLOOD PRESSURE: 78 MMHG | HEART RATE: 96 BPM

## 2022-06-09 DIAGNOSIS — G40.B09 NONINTRACTABLE JUVENILE MYOCLONIC EPILEPSY WITHOUT STATUS EPILEPTICUS: ICD-10-CM

## 2022-06-09 DIAGNOSIS — N63.10 LUMP OF RIGHT BREAST: ICD-10-CM

## 2022-06-09 DIAGNOSIS — Z12.31 SCREENING MAMMOGRAM FOR BREAST CANCER: ICD-10-CM

## 2022-06-09 DIAGNOSIS — Z00.00 WELLNESS EXAMINATION: Primary | ICD-10-CM

## 2022-06-09 DIAGNOSIS — I10 PRIMARY HYPERTENSION: ICD-10-CM

## 2022-06-09 DIAGNOSIS — E11.9 DIET-CONTROLLED DIABETES MELLITUS: ICD-10-CM

## 2022-06-09 DIAGNOSIS — E66.01 MORBID OBESITY: ICD-10-CM

## 2022-06-09 DIAGNOSIS — N39.41 URGE INCONTINENCE OF URINE: ICD-10-CM

## 2022-06-09 DIAGNOSIS — R22.1 LOCALIZED SWELLING, MASS AND LUMP, NECK: ICD-10-CM

## 2022-06-09 DIAGNOSIS — D72.9 NEUTROPHILIC LEUKOCYTOSIS: ICD-10-CM

## 2022-06-09 DIAGNOSIS — F33.42 MAJOR DEPRESSION, RECURRENT, FULL REMISSION: ICD-10-CM

## 2022-06-09 PROBLEM — M48.061 SPINAL STENOSIS OF LUMBAR REGION: Status: ACTIVE | Noted: 2022-06-09

## 2022-06-09 PROBLEM — E78.5 HYPERLIPIDEMIA: Status: ACTIVE | Noted: 2022-06-09

## 2022-06-09 PROBLEM — D35.2 PITUITARY ADENOMA: Status: ACTIVE | Noted: 2022-06-09

## 2022-06-09 PROBLEM — F41.9 ANXIETY: Status: ACTIVE | Noted: 2022-06-09

## 2022-06-09 PROBLEM — E11.40 DIABETIC NEUROPATHY: Status: ACTIVE | Noted: 2022-06-09

## 2022-06-09 PROBLEM — E03.9 HYPOTHYROIDISM: Status: ACTIVE | Noted: 2017-06-26

## 2022-06-09 PROBLEM — K21.9 GASTROESOPHAGEAL REFLUX DISEASE: Status: ACTIVE | Noted: 2022-06-09

## 2022-06-09 PROBLEM — Z72.0 TOBACCO USER: Status: ACTIVE | Noted: 2022-06-09

## 2022-06-09 LAB
ALBUMIN % SPEP (OHS): 49.9 (ref 48.1–59.5)
ALBUMIN SERPL BCP-MCNC: 3.5 G/DL
ALBUMIN/GLOB SERPL: 1.1 RATIO
ALPHA 1 GLOB (OHS): 0.26 GM/DL (ref 0–0.4)
ALPHA 1 GLOB% (OHS): 3.89 (ref 2.3–4.9)
ALPHA 2 GLOB % (OHS): 12.13 (ref 6.9–13)
ALPHA 2 GLOB (OHS): 0.81 GM/DL (ref 0.4–1)
BETA GLOB (OHS): 1.15 GM/DL (ref 0.7–1.3)
BETA GLOB% (OHS): 17.24 (ref 13.8–19.7)
GAMMA GLOBULIN % (OHS): 16.82 (ref 10.1–21.9)
GAMMA GLOBULIN (OHS): 1.13 GM/DL (ref 0.4–1.8)
GLOBULIN SER-MCNC: 3.2 GM/DL
M SPIKE % (OHS): NORMAL
M SPIKE (OHS): NORMAL
PATH REV: NORMAL
PROT SERPL-MCNC: 6.7 GM/DL (ref 6.4–8.3)

## 2022-06-09 PROCEDURE — 1159F PR MEDICATION LIST DOCUMENTED IN MEDICAL RECORD: ICD-10-PCS | Mod: CPTII,,, | Performed by: FAMILY MEDICINE

## 2022-06-09 PROCEDURE — 99396 PREV VISIT EST AGE 40-64: CPT | Mod: 25,,, | Performed by: FAMILY MEDICINE

## 2022-06-09 PROCEDURE — 99396 PR PREVENTIVE VISIT,EST,40-64: ICD-10-PCS | Mod: 25,,, | Performed by: FAMILY MEDICINE

## 2022-06-09 PROCEDURE — 1159F MED LIST DOCD IN RCRD: CPT | Mod: CPTII,,, | Performed by: FAMILY MEDICINE

## 2022-06-09 PROCEDURE — 3074F SYST BP LT 130 MM HG: CPT | Mod: CPTII,,, | Performed by: FAMILY MEDICINE

## 2022-06-09 PROCEDURE — 3008F BODY MASS INDEX DOCD: CPT | Mod: CPTII,,, | Performed by: FAMILY MEDICINE

## 2022-06-09 PROCEDURE — 3078F DIAST BP <80 MM HG: CPT | Mod: CPTII,,, | Performed by: FAMILY MEDICINE

## 2022-06-09 PROCEDURE — 99214 OFFICE O/P EST MOD 30 MIN: CPT | Mod: 25,,, | Performed by: FAMILY MEDICINE

## 2022-06-09 PROCEDURE — 3074F PR MOST RECENT SYSTOLIC BLOOD PRESSURE < 130 MM HG: ICD-10-PCS | Mod: CPTII,,, | Performed by: FAMILY MEDICINE

## 2022-06-09 PROCEDURE — 3066F PR DOCUMENTATION OF TREATMENT FOR NEPHROPATHY: ICD-10-PCS | Mod: CPTII,,, | Performed by: FAMILY MEDICINE

## 2022-06-09 PROCEDURE — 4010F PR ACE/ARB THEARPY RXD/TAKEN: ICD-10-PCS | Mod: CPTII,,, | Performed by: FAMILY MEDICINE

## 2022-06-09 PROCEDURE — 4010F ACE/ARB THERAPY RXD/TAKEN: CPT | Mod: CPTII,,, | Performed by: FAMILY MEDICINE

## 2022-06-09 PROCEDURE — 99214 PR OFFICE/OUTPT VISIT, EST, LEVL IV, 30-39 MIN: ICD-10-PCS | Mod: 25,,, | Performed by: FAMILY MEDICINE

## 2022-06-09 PROCEDURE — 3061F PR NEG MICROALBUMINURIA RESULT DOCUMENTED/REVIEW: ICD-10-PCS | Mod: CPTII,,, | Performed by: FAMILY MEDICINE

## 2022-06-09 PROCEDURE — 3066F NEPHROPATHY DOC TX: CPT | Mod: CPTII,,, | Performed by: FAMILY MEDICINE

## 2022-06-09 PROCEDURE — 3061F NEG MICROALBUMINURIA REV: CPT | Mod: CPTII,,, | Performed by: FAMILY MEDICINE

## 2022-06-09 PROCEDURE — 3008F PR BODY MASS INDEX (BMI) DOCUMENTED: ICD-10-PCS | Mod: CPTII,,, | Performed by: FAMILY MEDICINE

## 2022-06-09 PROCEDURE — 3078F PR MOST RECENT DIASTOLIC BLOOD PRESSURE < 80 MM HG: ICD-10-PCS | Mod: CPTII,,, | Performed by: FAMILY MEDICINE

## 2022-06-09 RX ORDER — LISINOPRIL 20 MG/1
20 TABLET ORAL DAILY
Qty: 30 TABLET | Refills: 6 | Status: SHIPPED | OUTPATIENT
Start: 2022-06-09 | End: 2022-09-29

## 2022-06-09 NOTE — PROGRESS NOTES
Amy VASQUEZ [facility]  2022  [id number]    Makayla Do MD  Patient Care Team:  Makayla Delaney MD as PCP - General (Family Medicine)      Chief Complaint:  No chief complaint on file.      History of Present Illness:  HPI    46 y.o. female who presents today for annual wellness visit with labs. Labs reviewed by me and were discussed with patient. All questions regarding lab results were answered. She is not active but does walk to the store when she needs. Denies melena, hematochezia, vaginal bleeding.     MM2020  C-scope: due at 47  Pneumovax: 2018    Juvenile myoclonic epilepsy: on antiepileptics and was seeing Dr. Bermeo but has an appt with Dr. Arrington next month    HTN: at goal, compliant with meds    HLD: compliant with statin, LDL at goal    Pituitary adenoma:  sees neurosurgeon in [place]. Has been having headaches and double vision so I advised patient to schedule an appt with them asap    DMII: a1c at 5.5. Diabetes is diet controlled. She is utd with diabetic eye exam.  On ACE inhibitor.    Depression: stable on lexapro. NO SI/HI.    Morbid Obesity: she has lost 10 lbs. She is portioning her food, trying to make healthier choices and walking to the store.     Leukocytosis: spep neg. Peripheral smear pending. Will send to hematology    As a separate e/m visit, patient c/o lump to right lateral breast. She noticed this last night. Denies pain, nipple discharge, skin changes or discoloration. No FH of breast cancer.     She also c/o a nodule to left anterior lower neck that occasionally swells up and is tender. NO dysphagia.       Review of Systems  General: denies f/c, weight loss, night sweats, decreased appetite  Eye: denies blurred vision, changes in vision  Respiratory: denies sob, wheezing, cough  Cardiovascular: denies chest pain, palpitations, edema  Gastrointestinal: denies abdominal pain, n/v, constipation, diarrhea  Integumentary: denies rashes, pruritis        Health  Maintenance  Health Maintenance Topics with due status: Not Due       Topic Last Completion Date    TETANUS VACCINE 08/02/2018    Influenza Vaccine 10/12/2020     Health Maintenance Due   Topic Date Due    Hepatitis C Screening  Never done    HIV Screening  Never done    Pneumococcal Vaccines (Age 0-64) (2 - PCV) 08/02/2019    Mammogram  07/16/2021    COVID-19 Vaccine (3 - Booster for Adelaide series) 04/15/2022       Exam:  Vitals:    06/09/22 1452   BP: 122/78   Pulse: 96   Resp: 18   Temp: 97.3 °F (36.3 °C)     Weight: 116.4 kg (256 lb 9.9 oz)   Body mass index is 41.74 kg/m².      Physical Exam  Gen: alert pleasant  Eyes: EOMI, no scleral injection  ENT: TM+ light reflexes bilaterally, no cerumen impaction or canal edema/erythema/discharge. No pharyngeal erythema, tonsillary hypertrophy  Oropharynx: no tonsillary hypertrophy, dental caries or disease. No lesions of palate, tongue.  Neck: no obvious swelling of neck, no tenderness, no lymphadenopathy or mass appreciated  Breast exam: NO axillary or supraclavicular lymphadenopathy. No nipple discharge, skin changes. She is tender to right lateral breast where she feels a lump. I could not appreciate a lump  CV: RRR no m/r/g, no LE edema. No JVD  Resp: CTAB, no accessory muscle use  GI: BS+, nontender, nondistended  Lymph: no cervical, postauricular, submandibular nodes  Psych: AA&Ox3        1. Wellness examination  -     Basic Metabolic Panel  -     Hemoglobin A1C  -     CBC Auto Differential  -     TSH    2. 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  I increased lamictal to 200 mg bid and she is also on depakote 1500 mg daily as well as valtoco nasal spray prn. She has an appt with Dr. Arrington July 2022.     Assessment & Plan:  Continue current meds and keep appt with Dr. Arrington      3. Diet-controlled diabetes mellitus  Overview:  a1c is stable at  5.8. Patient is asymptomatic and doing well.    Assessment & Plan:  rtc 6 mts with labs. Continue eating a healthy, diabetic diet.      4. Primary hypertension  Overview:  Well controlled on current meds    Assessment & Plan:  continue current meds, low salt diet, weight loss and exercise  Avoid drinking too much caffeine  Call me with pressure more than 140/90      Orders:  -     lisinopriL (PRINIVIL,ZESTRIL) 20 MG tablet    5. Major depression, recurrent, full remission  Overview:  She is stable on lexapro. Denies si/hi or any adverse effects    Assessment & Plan:  Continue lexapro 20 mg daily. Patient is doing well      6. Morbid obesity  Overview:  Patient has been counseled on dietary changes, portion control, calorie counting, exercise and weight loss.       Assessment & Plan:  - exercise for 30-45 min a day, 5x a week  - eat a total of 1500 calories daily with less than 70 total carbohydrates daily  - use my fitness pal to log foods and track calories  - eat lean meats like chicken, turkey, fish, lean ground beef. Avoid fried, fatty or processed foods.  - do not eat pasta, bread, rice, potatoes often        7. Neutrophilic leukocytosis  Overview:  This is a chronic finding. Could be due to patient having a long history of smoking or obesity. SPEP neg, peripheral smear pending      8. Urge incontinence of urine  Overview:  On toviaz. She is seeing urology    Assessment & Plan:  Continue toviaz and f/u with urology      9. Screening mammogram for breast cancer    10. Localized swelling, mass and lump, neck  -     US Soft Tissue Head Neck Thyroid    11. Lump of right breast  -     US Breast Right Complete  -     Mammo Digital Diagnostic Bilat with [facility]     Exercise for a total of 150 min per week and eat a healthy diet  Stay up to date with all cancer screening discussed in visit  Immunizations due were discussed during visit  All health maintenance was reviewed with patient. Patient verbalized understanding.  All questions were answered.     Follow up: Follow up in about 6 months (around 12/9/2022) for routine follow up with labs.      Care Plan/Goals: Reviewed   Goals    None

## 2022-06-09 NOTE — ASSESSMENT & PLAN NOTE
- exercise for 30-45 min a day, 5x a week  - eat a total of 1500 calories daily with less than 70 total carbohydrates daily  - use my fitness pal to log foods and track calories  - eat lean meats like chicken, turkey, fish, lean ground beef. Avoid fried, fatty or processed foods.  - do not eat pasta, bread, rice, potatoes often

## 2022-06-10 LAB — HEMATOLOGIST REVIEW: NORMAL

## 2022-06-13 DIAGNOSIS — D72.829 LEUKOCYTOSIS, UNSPECIFIED TYPE: Primary | ICD-10-CM

## 2022-06-21 ENCOUNTER — HOSPITAL ENCOUNTER (OUTPATIENT)
Dept: RADIOLOGY | Facility: HOSPITAL | Age: 47
Discharge: HOME | End: 2022-06-21
Attending: FAMILY MEDICINE
Payer: MEDICAID

## 2022-06-21 DIAGNOSIS — R22.1 LOCALIZED SWELLING, MASS AND LUMP, NECK: ICD-10-CM

## 2022-06-21 PROCEDURE — 76536 US EXAM OF HEAD AND NECK: CPT | Mod: TC

## 2022-06-23 NOTE — PROGRESS NOTES
PATIENT: Amy Driscoll  MRN: [id number]  DATE: 22      Chief Complaint: Dizziness    Reason for referral:  Leukocytosis  Reffering provider:  Dr Delaney  Oncologic History:      Oncologic History     Oncologic Treatment     Pathology       46 y.o. female who presented to her PCP for annual wellness visit with labs. Noted to have leukocytosis. Review of records show she has had this dating back to 2017 (she has many old CBCs on her cell phone portal with WBC up to 19K). Recently had a  negative SPEP and peripheral smear interpreted as reactive leukocytosis. Differencial and remainder of CBC is normal.  CMP with elevated globulin, occasional low albumin. Referred to hematology 22. No constitutional symptoms. Other than joint pain she has not complaints. His is morbidly obese with BMI 41 and is an active smoker.  MM2020  C-scope: due at 47  She smoked until 4 weeks ago was 1/2 ppd onset age 19yo  She has 2 major shoudler surgeries. She injured her arm due to a fall; torn rotator cuff and bicep tendons and has a cyst in her shoulder . Had to grind down the bone so she could move it . Second surgery  to . She has knee and ankle, left hip and back pain.       3 wk ago   (22) 1 mo ago   (22) 11 mo ago   (21) 11 mo ago   (21) 1 yr ago   (10/12/20) 1 yr ago   (10/12/20) 2 yr ago   (19) 2 yr ago   (19)   WBC 4.5 - 11.5 x10(3)/mcL 13.6 High   13.7 High   12.56 High  R   12.48 High  R      Normal differencials            Subjective:   Interval History: Ms. Driscoll is a 46 y.o. female who returns for followup:     Past Medical History:   Past Medical History:   Diagnosis Date    Anxiety disorder, unspecified     Arthropathy of spinal facet joint     Colitis     Diabetes mellitus with diabetic neuropathy     Diabetic neuropathy     Folliculitis     GERD (gastroesophageal reflux disease)     HTN (hypertension)     ELIEL (juvenile myoclonic epilepsy)     Lumbar  radiculopathy     Lumbar spinal stenosis     Lumbar spondylosis     Melanocytic nevus of face     Mixed hyperlipidemia     Morbid obesity     Pituitary adenoma     Seizures     Thyroid cyst     Type 2 diabetes mellitus     Urge incontinence of urine        Past Surgical HIstory:   Past Surgical History:   Procedure Laterality Date    BICEPS TENDON REPAIR  2018    CARPAL TUNNEL RELEASE       SECTION  01/10/2001     SECTION  10/09/1998    CHOLECYSTECTOMY      COLONOSCOPY  2017    Dr Marianna Rosas    PARTIAL HYSTERECTOMY  2008    ROTATOR CUFF REPAIR  2018    SHOULDER ARTHROSCOPY W/ SUPERIOR LABRAL ANTERIOR POSTERIOR REPAIR  2011    TONSILLECTOMY      TOTAL ABDOMINAL HYSTERECTOMY  2018       Family History:   Family History   Problem Relation Age of Onset    Hypertension Mother     Hyperlipidemia Mother     No Known Problems Father        Social History:  reports that she quit smoking about 3 weeks ago. Her smoking use included cigarettes. She has a 7.50 pack-year smoking history. She has never used smokeless tobacco. She reports previous alcohol use. She reports previous drug use. Drug: Marijuana.    Allergies:  Review of patient's allergies indicates:   Allergen Reactions    Codeine Nausea And Vomiting    Levetiracetam Rash     severe, irritability, pt treathen her neirghbors of cutting their children legs and killing their dogs    Meloxicam Nausea And Vomiting       Medications:  Current Outpatient Medications   Medication Sig Dispense Refill    atorvastatin (LIPITOR) 20 MG tablet TAKE 1 TABLET BY MOUTH EVERY DAY 30 tablet 4    divalproex ER (DEPAKOTE) 500 MG Tb24 Take 1,500 mg by mouth.      EScitalopram oxalate (LEXAPRO) 20 MG tablet TAKE 1 TABLET BY MOUTH EVERY DAY 30 tablet 4    EScitalopram oxalate (LEXAPRO) 20 MG tablet   See Instructions, TAKE 1 TABLET BY MOUTH EVERY DAY, # 30 tab(s), 5 Refill(s), Pharmacy: [facility]/pharmacy #[id number], 138,  "cm, Height/Length Dosing, 12/01/21 12:17:00 CST, 112.9, kg, Weight Dosing, 12/01/21 12:17:00 CST      lamoTRIgine (LAMICTAL) 200 MG tablet Take 1 tablet (200 mg total) by mouth 2 (two) times daily. 30 tablet 1    lisinopriL (PRINIVIL,ZESTRIL) 20 MG tablet Take 1 tablet (20 mg total) by mouth once daily. 30 tablet 6    TOVIAZ 4 mg Tb24       VALTOCO 15 mg/2 spray (7.5/0.1mL x 2) Spry SPRAY 1 SPRAY IN NOSTRIL ONCE X2 DOSES. MAY REPEAT ONCE IN 4 HOURS IF NEEDED 2 each 1     No current facility-administered medications for this visit.       Review of Systems   Constitutional: Negative.    HENT: Negative.    Eyes: Negative.    Respiratory: Negative.    Cardiovascular: Negative.    Gastrointestinal: Negative.    Endocrine: Negative.    Genitourinary: Negative.    Musculoskeletal: Positive for arthralgias and back pain.   Allergic/Immunologic: Negative.    Neurological: Negative.    Hematological: Negative.    Psychiatric/Behavioral: Negative.        ECOG Performance Status:   ECOG SCORE           Objective:      Vitals:   Vitals:    06/24/22 0824   BP: 128/85   BP Location: Right arm   Patient Position: Sitting   BP Method: Large (Automatic)   Pulse: 85   Resp: 18   Temp: 98.6 °F (37 °C)   TempSrc: Oral   SpO2: 96%   Weight: 114.5 kg (252 lb 6.4 oz)   Height: 5' 5.75" (1.67 m)     BMI: Body mass index is 41.05 kg/m².    Physical Exam    Laboratory Data:  No visits with results within 1 Week(s) from this visit.   Latest known visit with results is:   Lab Visit on 06/08/2022   Component Date Value Ref Range Status    Cholesterol Total 06/08/2022 136  <=200 mg/dL Final    HDL Cholesterol 06/08/2022 33 (A) 35 - 60 mg/dL Final    Triglyceride 06/08/2022 69  37 - 140 mg/dL Final    Cholesterol/HDL Ratio 06/08/2022 4  0 - 5 Final    Very Low Density Lipoprotein 06/08/2022 14   Final    LDL Cholesterol 06/08/2022 89.00  50.00 - 140.00 mg/dL Final    Vit D 25 OH 06/08/2022 53.0  30.0 - 80.0 ng/mL Final    Hemoglobin " A1c 06/08/2022 5.8  <=7.0 % Final    Estimated Average Glucose 06/08/2022 119.8  mg/dL Final    Sodium Level 06/08/2022 143  136 - 145 mmol/L Final    Potassium Level 06/08/2022 4.1  3.5 - 5.1 mmol/L Final    Chloride 06/08/2022 107  98 - 107 mmol/L Final    Carbon Dioxide 06/08/2022 26  22 - 29 mmol/L Final    Glucose Level 06/08/2022 134 (A) 74 - 100 mg/dL Final    Blood Urea Nitrogen 06/08/2022 7.0  7.0 - 18.7 mg/dL Final    Creatinine 06/08/2022 0.66  0.55 - 1.02 mg/dL Final    Calcium Level Total 06/08/2022 9.4  8.4 - 10.2 mg/dL Final    Protein Total 06/08/2022 7.1  6.4 - 8.3 gm/dL Final    Albumin Level 06/08/2022 3.3 (A) 3.5 - 5.0 gm/dL Final    Globulin 06/08/2022 3.8 (A) 2.4 - 3.5 gm/dL Final    Albumin/Globulin Ratio 06/08/2022 0.9 (A) 1.1 - 2.0 ratio Final    Bilirubin Total 06/08/2022 0.3  <=1.5 mg/dL Final    Alkaline Phosphatase 06/08/2022 70  40 - 150 unit/L Final    Alanine Aminotransferase 06/08/2022 23  0 - 55 unit/L Final    Aspartate Aminotransferase 06/08/2022 14  5 - 34 unit/L Final    Estimated GFR-Non  06/08/2022 >60  mls/min/1.73/m2 Final    Protein Total 06/08/2022 6.7  6.4 - 8.3 gm/dL Final    Albumin 06/08/2022 3.5  g/dL Final    Globulin 06/08/2022 3.2  gm/dL Final    Albumin/Globulin Ratio 06/08/2022 1.1  ratio Final    Albumin, SPEP 06/08/2022 49.9  48.1 - 59.5 Final    Alpha 1 Glob % 06/08/2022 3.89  2.3 - 4.9 Final    Alpha 1 Glob 06/08/2022 0.26  0.00 - 0.40 gm/dl Final    Alpha 2 Glob% 06/08/2022 12.13  6.9 - 13 Final    Alpha 2 Glob 06/08/2022 0.81  0.40 - 1.00 gm/dl Final    Beta Glob % 06/08/2022 17.24  13.8 - 19.7 Final    Beta Glob 06/08/2022 1.15  0.70 - 1.30 gm/dl Final    Gamma Globulin % 06/08/2022 16.82  10.1 - 21.9 Final    Gamma Globulin 06/08/2022 1.13  0.40 - 1.80 gm/dl Final    M Ramos % 06/08/2022    Final    Not Observed    M Ramos 06/08/2022    Final    Not Observed    Vitamin B12 Level 06/08/2022 394 613 - 608  pg/mL Final    WBC 06/08/2022 12.7 (A) 4.5 - 11.5 x10(3)/mcL Final    RBC 06/08/2022 4.58  4.20 - 5.40 x10(6)/mcL Final    Hgb 06/08/2022 13.8  12.0 - 16.0 gm/dL Final    Hct 06/08/2022 41.9  37.0 - 47.0 % Final    MCV 06/08/2022 91.5  80.0 - 94.0 fL Final    MCH 06/08/2022 30.1  27.0 - 31.0 pg Final    MCHC 06/08/2022 32.9 (A) 33.0 - 36.0 mg/dL Final    RDW 06/08/2022 14.3  11.5 - 17.0 % Final    Platelet 06/08/2022 304  130 - 400 x10(3)/mcL Final    MPV 06/08/2022 11.2  9.4 - 12.4 fL Final    Neut % 06/08/2022 55.8  % Final    Lymph % 06/08/2022 33.2  % Final    Mono % 06/08/2022 7.0  % Final    Eos % 06/08/2022 2.4  % Final    Basophil % 06/08/2022 0.8  % Final    Lymph # 06/08/2022 4.21  0.6 - 4.6 x10(3)/mcL Final    Neut # 06/08/2022 7.1  2.1 - 9.2 x10(3)/mcL Final    Mono # 06/08/2022 0.89  0.1 - 1.3 x10(3)/mcL Final    Eos # 06/08/2022 0.31  0 - 0.9 x10(3)/mcL Final    Baso # 06/08/2022 0.10  0 - 0.2 x10(3)/mcL Final    IG# 06/08/2022 0.10 (A) 0 - 0.0155 x10(3)/mcL Final    IG% 06/08/2022 0.8 (A) 0 - 0.43 % Final    NRBC% 06/08/2022 0.0  % Final    Color, UA 06/08/2022 Yellow  Yellow, Colorless, Other, Clear Final    Appearance, UA 06/08/2022 Clear  Clear Final    Specific Gravity, UA 06/08/2022 >=1.030   Final    pH, UA 06/08/2022 5.0  5.0, 5.5, 6.0, 6.5, 7.0, 7.5, 8.0, 8.5 Final    Protein, UA 06/08/2022 Negative  Negative, 300  mg/dL Final    Glucose, UA 06/08/2022 Negative  Negative, Normal mg/dL Final    Ketones, UA 06/08/2022 Negative  Negative, +1, +2, +3, +4, +5, >=160, >=80 mg/dL Final    Blood, UA 06/08/2022 Negative  Negative unit/L Final    Bilirubin, UA 06/08/2022 Negative  Negative mg/dL Final    Urobilinogen, UA 06/08/2022 0.2  0.2, 1.0, Normal mg/dL Final    Nitrites, UA 06/08/2022 Negative  Negative Final    Leukocyte Esterase, UA 06/08/2022 Negative  Negative, 75  unit/L Final    Urine Microalbumin 06/08/2022 7.5  <=30.0 ug/ml Final    Urine Creatinine  "06/08/2022 144.4 (A) 47.0 - 110.0 mg/dL Final    Microalbumin Creatinine Ratio 06/08/2022 5.2  0.0 - 30.0 mg/gm Cr Final    Bacteria, UA 06/08/2022 None Seen  None Seen, Rare, Occasional /HPF Final    RBC, UA 06/08/2022 None Seen  None Seen, 0-2, 3-5, 0-5 /HPF Final    WBC, UA 06/08/2022 None Seen  None Seen, 0-2, 3-5, 0-5 /HPF Final    Squamous Epithelial Cells, UA 06/08/2022 Moderate (A) None Seen, Rare, Occasional, Occ /LPF Final    Pathology Review 06/08/2022 SPEP:  Total protein and globulin are both within reference range.  A/G ratio is normal.      Serum protein electrophoresis shows a normal distribution of the main protein fractions.    No monoclonal proteins are detected.    Keri Juarez MD     Final    Peripheral Smear Evaluation 06/08/2022    Final                    Value:PERIPHERAL BLOOD:  Normocytic, normochromic red blood cell population with little variation in red cell size and shape.  Red cell morphologic variants include rouleaux formation.  No schistocytes are seen.  Polychromasia is present.      Mild leukocytosis with normal leukocyte differential and morphology.  A rare band and several reactive lymphocytes are present.  No immature granulocytes are seen and no blasts are seen.    Platelets are normal in number and morphology.    Comment:  Reactive lymphocytosis may be seen with infection (in particular viral), physiological stress, and with drug reaction.    Keri Juarez MD               Imaging:   Assessment:     1. Other elevated white blood cell (WBC) count    2. Morbid obesity    3. Smoker      Leukocytosis, likely reactive. It has been present since at least 2017. Multiple potential etiologies include smoking, obesity and arthritis. Would like to rule out CML. Path review of peripheral smear consistent with "Reative Lymphocytosis" though her differential is normal.   She does have joint pain so will also screen for rheumatologic conditions.    Plan:     Problem List " Items Addressed This Visit        Endocrine    Morbid obesity    Overview     Patient has been counseled on dietary changes, portion control, calorie counting, exercise and weight loss.                Other Visit Diagnoses     Other elevated white blood cell (WBC) count    -  Primary    Smoker          1.  ESR, CRP, BCR/abl, RF and LESLEY. RTC in 2-3 weeks.   2.  Obtain complete abdominal US to assess spleen.

## 2022-06-24 ENCOUNTER — OFFICE VISIT (OUTPATIENT)
Dept: HEMATOLOGY/ONCOLOGY | Facility: CLINIC | Age: 47
End: 2022-06-24
Payer: MEDICAID

## 2022-06-24 VITALS
OXYGEN SATURATION: 96 % | HEART RATE: 85 BPM | HEIGHT: 66 IN | RESPIRATION RATE: 18 BRPM | WEIGHT: 252.38 LBS | SYSTOLIC BLOOD PRESSURE: 128 MMHG | TEMPERATURE: 99 F | DIASTOLIC BLOOD PRESSURE: 85 MMHG | BODY MASS INDEX: 40.56 KG/M2

## 2022-06-24 DIAGNOSIS — F17.200 SMOKER: ICD-10-CM

## 2022-06-24 DIAGNOSIS — E66.01 MORBID OBESITY: ICD-10-CM

## 2022-06-24 DIAGNOSIS — D72.828 OTHER ELEVATED WHITE BLOOD CELL (WBC) COUNT: Primary | ICD-10-CM

## 2022-06-24 DIAGNOSIS — D72.829 LEUKOCYTOSIS: Primary | ICD-10-CM

## 2022-06-24 PROCEDURE — 99204 OFFICE O/P NEW MOD 45 MIN: CPT | Mod: ,,, | Performed by: INTERNAL MEDICINE

## 2022-06-24 PROCEDURE — 3066F NEPHROPATHY DOC TX: CPT | Mod: CPTII,,, | Performed by: INTERNAL MEDICINE

## 2022-06-24 PROCEDURE — 3061F NEG MICROALBUMINURIA REV: CPT | Mod: CPTII,,, | Performed by: INTERNAL MEDICINE

## 2022-06-24 PROCEDURE — 3008F BODY MASS INDEX DOCD: CPT | Mod: CPTII,,, | Performed by: INTERNAL MEDICINE

## 2022-06-24 PROCEDURE — 3074F PR MOST RECENT SYSTOLIC BLOOD PRESSURE < 130 MM HG: ICD-10-PCS | Mod: CPTII,,, | Performed by: INTERNAL MEDICINE

## 2022-06-24 PROCEDURE — 3079F DIAST BP 80-89 MM HG: CPT | Mod: CPTII,,, | Performed by: INTERNAL MEDICINE

## 2022-06-24 PROCEDURE — 99204 PR OFFICE/OUTPT VISIT, NEW, LEVL IV, 45-59 MIN: ICD-10-PCS | Mod: ,,, | Performed by: INTERNAL MEDICINE

## 2022-06-24 PROCEDURE — 3008F PR BODY MASS INDEX (BMI) DOCUMENTED: ICD-10-PCS | Mod: CPTII,,, | Performed by: INTERNAL MEDICINE

## 2022-06-24 PROCEDURE — 1159F PR MEDICATION LIST DOCUMENTED IN MEDICAL RECORD: ICD-10-PCS | Mod: CPTII,,, | Performed by: INTERNAL MEDICINE

## 2022-06-24 PROCEDURE — 3079F PR MOST RECENT DIASTOLIC BLOOD PRESSURE 80-89 MM HG: ICD-10-PCS | Mod: CPTII,,, | Performed by: INTERNAL MEDICINE

## 2022-06-24 PROCEDURE — 1159F MED LIST DOCD IN RCRD: CPT | Mod: CPTII,,, | Performed by: INTERNAL MEDICINE

## 2022-06-24 PROCEDURE — 4010F PR ACE/ARB THEARPY RXD/TAKEN: ICD-10-PCS | Mod: CPTII,,, | Performed by: INTERNAL MEDICINE

## 2022-06-24 PROCEDURE — 3061F PR NEG MICROALBUMINURIA RESULT DOCUMENTED/REVIEW: ICD-10-PCS | Mod: CPTII,,, | Performed by: INTERNAL MEDICINE

## 2022-06-24 PROCEDURE — 3066F PR DOCUMENTATION OF TREATMENT FOR NEPHROPATHY: ICD-10-PCS | Mod: CPTII,,, | Performed by: INTERNAL MEDICINE

## 2022-06-24 PROCEDURE — 3074F SYST BP LT 130 MM HG: CPT | Mod: CPTII,,, | Performed by: INTERNAL MEDICINE

## 2022-06-24 PROCEDURE — 4010F ACE/ARB THERAPY RXD/TAKEN: CPT | Mod: CPTII,,, | Performed by: INTERNAL MEDICINE

## 2022-06-28 DIAGNOSIS — D72.829 LEUKOCYTOSIS, UNSPECIFIED TYPE: ICD-10-CM

## 2022-06-28 RX ORDER — LAMOTRIGINE 200 MG/1
200 TABLET ORAL 2 TIMES DAILY
Qty: 30 TABLET | Refills: 1 | Status: SHIPPED | OUTPATIENT
Start: 2022-06-28 | End: 2023-10-11

## 2022-07-05 ENCOUNTER — HOSPITAL ENCOUNTER (OUTPATIENT)
Dept: RADIOLOGY | Facility: HOSPITAL | Age: 47
Discharge: HOME | End: 2022-07-05
Attending: INTERNAL MEDICINE
Payer: MEDICAID

## 2022-07-05 DIAGNOSIS — D72.829 LEUKOCYTOSIS: ICD-10-CM

## 2022-07-05 PROCEDURE — 76700 US EXAM ABDOM COMPLETE: CPT | Mod: TC

## 2022-07-06 ENCOUNTER — ANESTHESIA EVENT (OUTPATIENT)
Dept: SURGERY | Facility: HOSPITAL | Age: 47
End: 2022-07-06
Payer: MEDICAID

## 2022-07-06 NOTE — ANESTHESIA PREPROCEDURE EVALUATION
07/06/2022  Amy Driscoll is a 46 y.o., female.      Pre-op Assessment    I have reviewed the Patient Summary Reports.       I have reviewed the Medications.     Review of Systems  Anesthesia Hx:  No problems with previous Anesthesia  Denies Family Hx of Anesthesia complications.   Denies Personal Hx of Anesthesia complications.   Social:  Non-Smoker    Hematology/Oncology:  Hematology Normal   Oncology Normal     EENT/Dental:EENT/Dental Normal   Cardiovascular:  Cardiovascular Normal     Pulmonary:  Pulmonary Normal    Renal/:  Renal/ Normal     Hepatic/GI:  Hepatic/GI Normal    Musculoskeletal:  Musculoskeletal Normal    Neurological:  Neurology Normal    Endocrine:  Endocrine Normal    Psych:  Psychiatric Normal           Physical Exam  General: Well nourished, Cooperative, Alert and Oriented    Airway:  Mallampati: II   Mouth Opening: Normal  TM Distance: Normal  Tongue: Normal  Neck ROM: Normal ROM    Dental:  Intact  Poor dentition w decay at base of all upper teeth  Chest/Lungs:  Normal Respiratory Rate    Heart:  Rate: Normal    Musculoskeletal:Normal mobility      Anesthesia Plan  Type of Anesthesia, risks & benefits discussed:    Anesthesia Type: MAC  Intra-op Monitoring Plan: Standard ASA Monitors  Post Op Pain Control Plan: multimodal analgesia  Induction:  IV  Informed Consent: Informed consent signed with the Patient and all parties understand the risks and agree with anesthesia plan.  All questions answered. Patient consented to blood products? Yes  ASA Score: 3  Day of Surgery Review of History & Physical: H&P Update referred to the surgeon/provider.  Anesthesia Plan Notes: Anesthesia plan was discussed with patient and/or representative. Risks and alternatives were discussed including the possibility of alteration of plan.     Ready For Surgery From Anesthesia Perspective.     .

## 2022-07-07 ENCOUNTER — ANESTHESIA (OUTPATIENT)
Dept: SURGERY | Facility: HOSPITAL | Age: 47
End: 2022-07-07
Payer: MEDICAID

## 2022-07-07 ENCOUNTER — HOSPITAL ENCOUNTER (OUTPATIENT)
Facility: HOSPITAL | Age: 47
Discharge: HOME | End: 2022-07-07
Attending: INTERNAL MEDICINE | Admitting: INTERNAL MEDICINE
Payer: MEDICAID

## 2022-07-07 VITALS
DIASTOLIC BLOOD PRESSURE: 85 MMHG | TEMPERATURE: 98 F | SYSTOLIC BLOOD PRESSURE: 141 MMHG | HEIGHT: 64 IN | WEIGHT: 255.81 LBS | RESPIRATION RATE: 18 BRPM | BODY MASS INDEX: 43.67 KG/M2 | OXYGEN SATURATION: 99 % | HEART RATE: 78 BPM

## 2022-07-07 DIAGNOSIS — R12 HEARTBURN: ICD-10-CM

## 2022-07-07 DIAGNOSIS — K21.9 GASTROESOPHAGEAL REFLUX DISEASE, UNSPECIFIED WHETHER ESOPHAGITIS PRESENT: Primary | ICD-10-CM

## 2022-07-07 LAB — POCT GLUCOSE: 136 MG/DL (ref 70–110)

## 2022-07-07 PROCEDURE — 27201423 OPTIME MED/SURG SUP & DEVICES STERILE SUPPLY: Performed by: INTERNAL MEDICINE

## 2022-07-07 PROCEDURE — D9220AH HC ANESTHESIA PROFESSIONAL FEE: Mod: QZ,QS | Performed by: NURSE ANESTHETIST, CERTIFIED REGISTERED

## 2022-07-07 PROCEDURE — 37000008 HC ANESTHESIA 1ST 15 MINUTES: Performed by: INTERNAL MEDICINE

## 2022-07-07 PROCEDURE — 63600175 PHARM REV CODE 636 W HCPCS: Performed by: NURSE ANESTHETIST, CERTIFIED REGISTERED

## 2022-07-07 PROCEDURE — 37000009 HC ANESTHESIA EA ADD 15 MINS: Performed by: INTERNAL MEDICINE

## 2022-07-07 PROCEDURE — 25000003 PHARM REV CODE 250: Performed by: NURSE ANESTHETIST, CERTIFIED REGISTERED

## 2022-07-07 PROCEDURE — 00731 ANES UPR GI NDSC PX NOS: CPT | Performed by: INTERNAL MEDICINE

## 2022-07-07 PROCEDURE — 43239 EGD BIOPSY SINGLE/MULTIPLE: CPT | Performed by: INTERNAL MEDICINE

## 2022-07-07 PROCEDURE — 00731 ANES UPR GI NDSC PX NOS: CPT | Mod: QZ,QS | Performed by: NURSE ANESTHETIST, CERTIFIED REGISTERED

## 2022-07-07 RX ORDER — LIDOCAINE HYDROCHLORIDE 10 MG/ML
INJECTION, SOLUTION EPIDURAL; INFILTRATION; INTRACAUDAL; PERINEURAL
Status: DISCONTINUED | OUTPATIENT
Start: 2022-07-07 | End: 2022-07-07

## 2022-07-07 RX ORDER — SODIUM CHLORIDE 9 MG/ML
INJECTION, SOLUTION INTRAVENOUS CONTINUOUS
Status: ACTIVE | OUTPATIENT
Start: 2022-07-07

## 2022-07-07 RX ORDER — ONDANSETRON 2 MG/ML
INJECTION INTRAMUSCULAR; INTRAVENOUS
Status: DISCONTINUED | OUTPATIENT
Start: 2022-07-07 | End: 2022-07-07

## 2022-07-07 RX ORDER — PROPOFOL 10 MG/ML
VIAL (ML) INTRAVENOUS
Status: DISCONTINUED | OUTPATIENT
Start: 2022-07-07 | End: 2022-07-07

## 2022-07-07 RX ADMIN — PROPOFOL 100 MG: 10 INJECTION, EMULSION INTRAVENOUS at 08:07

## 2022-07-07 RX ADMIN — LIDOCAINE HYDROCHLORIDE 20 MG: 10 INJECTION, SOLUTION EPIDURAL; INFILTRATION; INTRACAUDAL; PERINEURAL at 08:07

## 2022-07-07 RX ADMIN — ONDANSETRON 4 MG: 2 INJECTION INTRAMUSCULAR; INTRAVENOUS at 08:07

## 2022-07-07 RX ADMIN — PROPOFOL 60 MG: 10 INJECTION, EMULSION INTRAVENOUS at 08:07

## 2022-07-07 RX ADMIN — SODIUM CHLORIDE: 9 INJECTION, SOLUTION INTRAVENOUS at 06:07

## 2022-07-07 NOTE — ANESTHESIA POSTPROCEDURE EVALUATION
Anesthesia Post Evaluation    Patient: Amy Driscoll    Procedure(s) Performed: Procedure(s) (LRB):  EGD (ESOPHAGOGASTRODUODENOSCOPY) (N/A)    Final Anesthesia Type: MAC      Patient location during evaluation: floor  Patient participation: Yes- Able to Participate  Level of consciousness: awake and alert  Post-procedure vital signs: reviewed and stable  Pain management: adequate  Airway patency: patent    PONV status at discharge: No PONV  Anesthetic complications: no      Cardiovascular status: blood pressure returned to baseline  Respiratory status: unassisted  Hydration status: euvolemic  Follow-up not needed.          Vitals Value Taken Time   BP  07/07/22 0818   Temp  07/07/22 0818   Pulse  07/07/22 0818   Resp  07/07/22 0818   SpO2  07/07/22 0818         No case tracking events are documented in the log.      Pain/Huang Score: No data recorded

## 2022-07-07 NOTE — H&P
Endoscopy History and Physical    PCP - Makayla Do MD    Procedure - EGD  Sedation: MAC  ASA - per anesthesia  Mallampati - per anesthesia  History of Anesthesia problems - no  Family history Anesthesia problems -  no     HPI:  This is a 46 y.o. female here for evaluation of: heartburn, dysphagia and regurgitation     Reflux - yes  Dysphagia - yes  Abdominal pain - no  Diarrhea - no    ROS:  Constitutional: No fevers, chills, No weight loss  ENT: No allergies  CV: No chest pain  Pulm: No cough, No shortness of breath  Ophtho: No vision changes  GI: see HPI  Medical History:  has a past medical history of Anxiety disorder, unspecified, Arthropathy of spinal facet joint, Colitis, Diabetes mellitus with diabetic neuropathy, Diabetic neuropathy, Folliculitis, GERD (gastroesophageal reflux disease), HTN (hypertension), ELIEL (juvenile myoclonic epilepsy), Lumbar radiculopathy, Lumbar spinal stenosis, Lumbar spondylosis, Melanocytic nevus of face, Mixed hyperlipidemia, Morbid obesity, Pituitary adenoma, Seizures, Thyroid cyst, Type 2 diabetes mellitus, and Urge incontinence of urine.    Surgical History:  has a past surgical history that includes Cholecystectomy (); Tonsillectomy; Carpal tunnel release;  section (01/10/2001);  section (10/09/1998); Colonoscopy (2017); Partial hysterectomy (); Total abdominal hysterectomy (2018); Rotator cuff repair (2018); Biceps tendon repair (2018); and Shoulder arthroscopy w/ superior labral anterior posterior repair (2011).    Family History: family history includes Hyperlipidemia in her mother; Hypertension in her mother; No Known Problems in her father.. Otherwise no colon cancer, inflammatory bowel disease, or GI malignancies.    Social History:  reports that she has been smoking cigarettes. She has a 7.50 pack-year smoking history. She has never used smokeless tobacco. She reports previous alcohol use. She reports previous  drug use. Drug: Marijuana.    Review of patient's allergies indicates:   Allergen Reactions    Codeine Nausea And Vomiting    Levetiracetam Rash     severe, irritability, pt treathen her neirghbors of cutting their children legs and killing their dogs    Meloxicam Nausea And Vomiting       Medications:   Medications Prior to Admission   Medication Sig Dispense Refill Last Dose    atorvastatin (LIPITOR) 20 MG tablet TAKE 1 TABLET BY MOUTH EVERY DAY 30 tablet 4 7/6/2022 at Unknown time    divalproex ER (DEPAKOTE) 500 MG Tb24 Take 1,500 mg by mouth.   7/6/2022 at Unknown time    EScitalopram oxalate (LEXAPRO) 20 MG tablet TAKE 1 TABLET BY MOUTH EVERY DAY 30 tablet 4 7/6/2022 at Unknown time    lamoTRIgine (LAMICTAL) 200 MG tablet Take 1 tablet (200 mg total) by mouth 2 (two) times daily. 30 tablet 1 7/6/2022 at Unknown time    lisinopriL (PRINIVIL,ZESTRIL) 20 MG tablet Take 1 tablet (20 mg total) by mouth once daily. 30 tablet 6 7/6/2022 at Unknown time    TOVIAZ 4 mg Tb24    7/6/2022 at Unknown time    VALTOCO 15 mg/2 spray (7.5/0.1mL x 2) Spry SPRAY 1 SPRAY IN NOSTRIL ONCE X2 DOSES. MAY REPEAT ONCE IN 4 HOURS IF NEEDED 2 each 1 7/6/2022 at Unknown time    EScitalopram oxalate (LEXAPRO) 20 MG tablet   See Instructions, TAKE 1 TABLET BY MOUTH EVERY DAY, # 30 tab(s), 5 Refill(s), Pharmacy: [facility]/pharmacy #[id number], 165, cm, Height/Length Dosing, 12/01/21 12:17:00 CST, 112.9, kg, Weight Dosing, 12/01/21 12:17:00 CST          Objective Findings:    Vital Signs: Per nursing notes.    Physical Exam:  General Appearance: Well appearing in no acute distress  Head:   Normocephalic, without obvious abnormality  Eyes:    No scleral icterus  Airway: Open  Neck: No restriction in mobility  Lungs: CTA bilaterally in anterior and posterior fields, no wheezes, no crackles.  Heart:  Regular rate and rhythm, S1, S2 normal, no murmurs heard  Abdomen: Soft, non tender, non distended      Labs:  Lab Results   Component Value Date     WBC 12.7 (H) 06/08/2022    HGB 13.8 06/08/2022    HCT 41.9 06/08/2022     06/08/2022    CHOL 136 06/08/2022    TRIG 69 06/08/2022    HDL 33 (L) 06/08/2022    ALT 23 06/08/2022    AST 14 06/08/2022     06/08/2022    K 4.1 06/08/2022    CREATININE 0.66 06/08/2022    BUN 7.0 06/08/2022    CO2 26 06/08/2022    TSH 3.8011 07/28/2021    HGBA1C 5.8 06/08/2022         I have explained the risks and benefits of endoscopy procedures to the patient including but not limited to bleeding, perforation, infection, and death.    Thank you so much for allowing me to participate in the care of Amy Herrera MD

## 2022-07-07 NOTE — DISCHARGE SUMMARY
[facility]  Discharge Note  Short Stay    Procedure(s) (LRB):  EGD (ESOPHAGOGASTRODUODENOSCOPY) (N/A)    OUTCOME: Condition has improved and patient is now ready for discharge.    DISPOSITION: Home or Self Care    FINAL DIAGNOSIS:  <principal problem not specified>    FOLLOWUP: With primary care provider    DISCHARGE INSTRUCTIONS:  No discharge procedures on file.      Clinical Reference Documents Added to Patient Instructions       Document    UPPER GI ENDOSCOPY DISCHARGE INSTRUCTIONS (ENGLISH)          TIME SPENT ON DISCHARGE: 15 minutes

## 2022-07-08 LAB
ESTROGEN SERPL-MCNC: NORMAL PG/ML
INSULIN SERPL-ACNC: NORMAL U[IU]/ML
LAB AP CLINICAL INFORMATION: NORMAL
LAB AP GROSS DESCRIPTION: NORMAL
LAB AP REPORT FOOTNOTES: NORMAL
T3RU NFR SERPL: NORMAL %

## 2022-07-22 ENCOUNTER — OFFICE VISIT (OUTPATIENT)
Dept: HEMATOLOGY/ONCOLOGY | Facility: CLINIC | Age: 47
End: 2022-07-22
Payer: MEDICAID

## 2022-07-22 VITALS
WEIGHT: 258.06 LBS | OXYGEN SATURATION: 97 % | TEMPERATURE: 98 F | SYSTOLIC BLOOD PRESSURE: 131 MMHG | HEART RATE: 74 BPM | HEIGHT: 64 IN | RESPIRATION RATE: 18 BRPM | BODY MASS INDEX: 44.06 KG/M2 | DIASTOLIC BLOOD PRESSURE: 78 MMHG

## 2022-07-22 DIAGNOSIS — R79.82 ELEVATED C-REACTIVE PROTEIN (CRP): ICD-10-CM

## 2022-07-22 DIAGNOSIS — R76.8 POSITIVE ANA (ANTINUCLEAR ANTIBODY): Primary | ICD-10-CM

## 2022-07-22 DIAGNOSIS — D72.829 LEUKOCYTOSIS: ICD-10-CM

## 2022-07-22 DIAGNOSIS — F17.200 SMOKER: ICD-10-CM

## 2022-07-22 DIAGNOSIS — D72.829 LEUKOCYTOSIS, UNSPECIFIED TYPE: ICD-10-CM

## 2022-07-22 DIAGNOSIS — R21 BUTTERFLY RASH: ICD-10-CM

## 2022-07-22 DIAGNOSIS — E66.01 MORBID OBESITY: ICD-10-CM

## 2022-07-22 DIAGNOSIS — D72.9 NEUTROPHILIC LEUKOCYTOSIS: Primary | ICD-10-CM

## 2022-07-22 PROCEDURE — 4010F PR ACE/ARB THEARPY RXD/TAKEN: ICD-10-PCS | Mod: CPTII,,, | Performed by: INTERNAL MEDICINE

## 2022-07-22 PROCEDURE — 3075F SYST BP GE 130 - 139MM HG: CPT | Mod: CPTII,,, | Performed by: INTERNAL MEDICINE

## 2022-07-22 PROCEDURE — 1159F MED LIST DOCD IN RCRD: CPT | Mod: CPTII,,, | Performed by: INTERNAL MEDICINE

## 2022-07-22 PROCEDURE — 4010F ACE/ARB THERAPY RXD/TAKEN: CPT | Mod: CPTII,,, | Performed by: INTERNAL MEDICINE

## 2022-07-22 PROCEDURE — 3061F PR NEG MICROALBUMINURIA RESULT DOCUMENTED/REVIEW: ICD-10-PCS | Mod: CPTII,,, | Performed by: INTERNAL MEDICINE

## 2022-07-22 PROCEDURE — 3066F NEPHROPATHY DOC TX: CPT | Mod: CPTII,,, | Performed by: INTERNAL MEDICINE

## 2022-07-22 PROCEDURE — 1159F PR MEDICATION LIST DOCUMENTED IN MEDICAL RECORD: ICD-10-PCS | Mod: CPTII,,, | Performed by: INTERNAL MEDICINE

## 2022-07-22 PROCEDURE — 99213 PR OFFICE/OUTPT VISIT, EST, LEVL III, 20-29 MIN: ICD-10-PCS | Mod: ,,, | Performed by: INTERNAL MEDICINE

## 2022-07-22 PROCEDURE — 3008F BODY MASS INDEX DOCD: CPT | Mod: CPTII,,, | Performed by: INTERNAL MEDICINE

## 2022-07-22 PROCEDURE — 3061F NEG MICROALBUMINURIA REV: CPT | Mod: CPTII,,, | Performed by: INTERNAL MEDICINE

## 2022-07-22 PROCEDURE — 3066F PR DOCUMENTATION OF TREATMENT FOR NEPHROPATHY: ICD-10-PCS | Mod: CPTII,,, | Performed by: INTERNAL MEDICINE

## 2022-07-22 PROCEDURE — 3078F PR MOST RECENT DIASTOLIC BLOOD PRESSURE < 80 MM HG: ICD-10-PCS | Mod: CPTII,,, | Performed by: INTERNAL MEDICINE

## 2022-07-22 PROCEDURE — 3075F PR MOST RECENT SYSTOLIC BLOOD PRESS GE 130-139MM HG: ICD-10-PCS | Mod: CPTII,,, | Performed by: INTERNAL MEDICINE

## 2022-07-22 PROCEDURE — 3078F DIAST BP <80 MM HG: CPT | Mod: CPTII,,, | Performed by: INTERNAL MEDICINE

## 2022-07-22 PROCEDURE — 3008F PR BODY MASS INDEX (BMI) DOCUMENTED: ICD-10-PCS | Mod: CPTII,,, | Performed by: INTERNAL MEDICINE

## 2022-07-22 PROCEDURE — 99213 OFFICE O/P EST LOW 20 MIN: CPT | Mod: ,,, | Performed by: INTERNAL MEDICINE

## 2022-07-22 RX ORDER — PRIMIDONE 50 MG/1
50 TABLET ORAL DAILY
COMMUNITY
Start: 2022-07-20

## 2022-07-22 NOTE — PROGRESS NOTES
PATIENT: Amy Driscoll  MRN: [id number]  DATE: 2022      Chief Complaint: Results    Reason for referral:  Leukocytosis  Reffering provider:  Dr Delaney  Oncologic History:      Oncologic History     Oncologic Treatment     Pathology       46 y.o. female who presented to her PCP for annual wellness visit with labs. Noted to have leukocytosis. Review of records show she has had this dating back to 2017 (she has many old CBCs on her cell phone portal with WBC up to 19K). Recently had a  negative SPEP and peripheral smear interpreted as reactive leukocytosis. Differencial and remainder of CBC is normal.  CMP with elevated globulin, occasional low albumin. Referred to hematology 22. No constitutional symptoms. Other than joint pain she has no complaints. His is morbidly obese with BMI 41 and is an active smoker.  MM2020  C-scope: due at 47  She smoked until 4 weeks ago was 1/2 ppd onset age 17yo  She has 2 major shoudler surgeries. She injured her arm due to a fall; torn rotator cuff and bicep tendons and has a cyst in her shoulder . Had to grind down the bone so she could move it . Second surgery  to . She has knee and ankle, left hip and back pain.       3 wk ago   (22) 1 mo ago   (22) 11 mo ago   (21) 11 mo ago   (21) 1 yr ago   (10/12/20) 1 yr ago   (10/12/20) 2 yr ago   (19) 2 yr ago   (19)   WBC 4.5 - 11.5 x10(3)/mcL 13.6 High   13.7 High   12.56 High  R   12.48 High  R      Normal differencials            Subjective:   Interval History: Ms. Driscoll is a 46 y.o. female who returns for followup:  Lab testing reveals negative BCR/abl, elevated ESR and CRP both 24, Positive LESLEY speckled 1:80. She does have a butterfly rash. Abdominal US with prominent liver but otherwise unremarkable.      Past Medical History:   Past Medical History:   Diagnosis Date    Anxiety disorder, unspecified     Arthropathy of spinal facet joint     Colitis     Diabetes mellitus  with diabetic neuropathy     Diabetic neuropathy     Folliculitis     GERD (gastroesophageal reflux disease)     HTN (hypertension)     ELIEL (juvenile myoclonic epilepsy)     Lumbar radiculopathy     Lumbar spinal stenosis     Lumbar spondylosis     Melanocytic nevus of face     Mixed hyperlipidemia     Morbid obesity     Pituitary adenoma     Seizures     Thyroid cyst     Type 2 diabetes mellitus     Urge incontinence of urine        Past Surgical HIstory:   Past Surgical History:   Procedure Laterality Date    BICEPS TENDON REPAIR  2018    CARPAL TUNNEL RELEASE       SECTION  01/10/2001     SECTION  10/09/1998    CHOLECYSTECTOMY      COLONOSCOPY  2017    Dr Marianna Rosas    ESOPHAGOGASTRODUODENOSCOPY N/A 2022    Procedure: EGD (ESOPHAGOGASTRODUODENOSCOPY);  Surgeon: Jn Herrera MD;  Location: [facility];  Service: Gastroenterology;  Laterality: N/A;    PARTIAL HYSTERECTOMY      ROTATOR CUFF REPAIR  2018    SHOULDER ARTHROSCOPY W/ SUPERIOR LABRAL ANTERIOR POSTERIOR REPAIR  2011    TONSILLECTOMY      TOTAL ABDOMINAL HYSTERECTOMY  2018       Family History:   Family History   Problem Relation Age of Onset    Hypertension Mother     Hyperlipidemia Mother     No Known Problems Father        Social History:  reports that she quit smoking about 7 weeks ago. Her smoking use included cigarettes. She has a 7.50 pack-year smoking history. She has never used smokeless tobacco. She reports previous alcohol use. She reports previous drug use. Drug: Marijuana.    Allergies:  Review of patient's allergies indicates:   Allergen Reactions    Codeine Nausea And Vomiting    Levetiracetam Rash     severe, irritability, pt treathen her neirghbors of cutting their children legs and killing their dogs    Meloxicam Nausea And Vomiting       Medications:  Current Outpatient Medications   Medication Sig Dispense Refill    atorvastatin (LIPITOR) 20 MG  tablet TAKE 1 TABLET BY MOUTH EVERY DAY 30 tablet 4    divalproex ER (DEPAKOTE) 500 MG Tb24 Take 1,500 mg by mouth.      EScitalopram oxalate (LEXAPRO) 20 MG tablet TAKE 1 TABLET BY MOUTH EVERY DAY 30 tablet 4    lamoTRIgine (LAMICTAL) 200 MG tablet Take 1 tablet (200 mg total) by mouth 2 (two) times daily. 30 tablet 1    primidone (MYSOLINE) 50 MG Tab Take 50 mg by mouth once daily.      TOVIAZ 4 mg Tb24       VALTOCO 15 mg/2 spray (7.5/0.1mL x 2) Spry SPRAY 1 SPRAY IN NOSTRIL ONCE X2 DOSES. MAY REPEAT ONCE IN 4 HOURS IF NEEDED 2 each 1    lisinopriL (PRINIVIL,ZESTRIL) 20 MG tablet Take 1 tablet (20 mg total) by mouth once daily. 30 tablet 6     No current facility-administered medications for this visit.     Facility-Administered Medications Ordered in Other Visits   Medication Dose Route Frequency Provider Last Rate Last Admin    0.9%  NaCl infusion   Intravenous Continuous Jessica Dang CRNA 10 mL/hr at 07/07/22 0600 Restarted at 07/07/22 0816       Review of Systems   Constitutional: Negative.  Negative for appetite change and unexpected weight change.   HENT: Negative.  Negative for mouth sores.    Eyes: Negative.  Negative for visual disturbance.   Respiratory: Negative.  Negative for cough and shortness of breath.    Cardiovascular: Negative.  Negative for chest pain.   Gastrointestinal: Negative.  Negative for abdominal pain and diarrhea.   Endocrine: Negative.    Genitourinary: Negative.  Negative for frequency.   Musculoskeletal: Positive for arthralgias and back pain.   Skin: Negative for rash.   Allergic/Immunologic: Negative.    Neurological: Negative.  Negative for headaches.   Hematological: Negative.  Negative for adenopathy.   Psychiatric/Behavioral: Negative.  The patient is not nervous/anxious.        ECOG Performance Status:   ECOG SCORE           Objective:      Vitals:   Vitals:    07/22/22 1021   BP: 131/78   BP Location: Right arm   Patient Position: Sitting   BP Method: Large  "(Automatic)   Pulse: 74   Resp: 18   Temp: 98.3 °F (36.8 °C)   TempSrc: Oral   SpO2: 97%   Weight: 117 kg (258 lb 0.6 oz)   Height: 5' 4" (1.626 m)     BMI: Body mass index is 44.29 kg/m².    Physical Exam    Laboratory Data:  No visits with results within 1 Week(s) from this visit.   Latest known visit with results is:   Admission on 07/07/2022, Discharged on 07/07/2022   Component Date Value Ref Range Status    POCT Glucose 07/07/2022 136 (A) 70 - 110 mg/dL Final    Case Report 07/07/2022    Final                    Value:Surgical Pathology                                Case: [id number]                                 Authorizing Provider:  Jn Herrera MD           Collected:           07/07/2022 08:35 AM          Ordering Location:     [facility] -     Received:            07/07/2022 04:09 PM                                 [facility]                                                              Pathologist:           Venancio Dejesus MD                                                         Specimens:   1) - Stomach, STOMACH r/o H.Pylori                                                                  2) - Esophagus, DISTAL ESOPHAGUS                                                                    3) - Esophagus, MID ESOPHAGUS                                                              Final Diagnosis 07/07/2022    Final                    Value:This result contains rich text formatting which cannot be displayed here.    Clinical Information 07/07/2022    Final                    Value:This result contains rich text formatting which cannot be displayed here.    Microscopic Description 07/07/2022    Final                    Value:This result contains rich text formatting which cannot be displayed here.    Gross Description 07/07/2022    Final                    Value:This result contains rich text formatting which cannot be displayed here.    Report Footnotes 07/07/2022    Final      " "              Value:This result contains rich text formatting which cannot be displayed here.         Imaging:   Assessment:     No diagnosis found.  Leukocytosis, likely reactive. It has been present since at least 2017. Multiple potential etiologies include smoking, obesity and arthritis. Testing for CM is negative. Path review of peripheral smear consistent with "Reative Lymphocytosis" though her differential is normal. ESR is elevated at 24 and CRP is elevated at 24. She has a + LESLEY speckled pattern 1:80, and, has a butterfly rash and joint pain. She states that her neurologist who is seeing her for seizures is planning on working her up for kidney and liver disease. Will obtain a lupus panel and refer to rheumatology.   She does have joint pain so will also screen for rheumatologic conditions.  A complete abdominal US  On7/5/22 does not reveal an enlarged spleen. Liver is 18.5 cm and US is otherwise unremarkable.     Plan:     Problem List Items Addressed This Visit    None     1. Obtain a lupus panel, refer to rheumatology. RTC in 6 months with CBC and ESR.   2.  Obtain complete abdominal US to assess spleen.      "

## 2022-07-27 ENCOUNTER — LAB VISIT (OUTPATIENT)
Dept: LAB | Facility: HOSPITAL | Age: 47
End: 2022-07-27
Attending: PSYCHIATRY & NEUROLOGY
Payer: MEDICAID

## 2022-07-27 DIAGNOSIS — E83.00 COPPER METABOLISM DISORDER: ICD-10-CM

## 2022-07-27 DIAGNOSIS — I51.9 MYXEDEMA HEART DISEASE: ICD-10-CM

## 2022-07-27 DIAGNOSIS — D72.9 NEUTROPHILIC LEUKOCYTOSIS: ICD-10-CM

## 2022-07-27 DIAGNOSIS — T56.0X1A ENDEMIC COLIC: ICD-10-CM

## 2022-07-27 DIAGNOSIS — E23.0 PANHYPOPITUITARISM: ICD-10-CM

## 2022-07-27 DIAGNOSIS — E53.1 VITAMIN B6 DEFICIENCY: ICD-10-CM

## 2022-07-27 DIAGNOSIS — R76.0 ABNORMAL ANTINUCLEAR ANTIBODY TITER: ICD-10-CM

## 2022-07-27 DIAGNOSIS — E66.8 OBESITY OF ENDOCRINE ORIGIN: ICD-10-CM

## 2022-07-27 DIAGNOSIS — E22.1 IDIOPATHIC HYPERPROLACTINEMIA: ICD-10-CM

## 2022-07-27 DIAGNOSIS — E03.9 MYXEDEMA HEART DISEASE: ICD-10-CM

## 2022-07-27 DIAGNOSIS — R89.1 ABNORMAL LEVEL OF HORMONES IN SPECIMENS FROM OTH ORG/TISS: ICD-10-CM

## 2022-07-27 DIAGNOSIS — Z51.81 ENCOUNTER FOR THERAPEUTIC DRUG MONITORING: ICD-10-CM

## 2022-07-27 DIAGNOSIS — E64.2 SEQUELAE OF VITAMIN C DEFICIENCY: ICD-10-CM

## 2022-07-27 DIAGNOSIS — E55.9 VITAMIN D DEFICIENCY DISEASE: ICD-10-CM

## 2022-07-27 DIAGNOSIS — B34.0 DISEASE DUE TO ADENOVIRUS: ICD-10-CM

## 2022-07-27 DIAGNOSIS — E28.0 HYPERESTROGENISM: ICD-10-CM

## 2022-07-27 DIAGNOSIS — I77.6 ARTERITIS, UNSPECIFIED: ICD-10-CM

## 2022-07-27 DIAGNOSIS — Z79.899 ENCOUNTER FOR LONG-TERM (CURRENT) USE OF OTHER MEDICATIONS: Primary | ICD-10-CM

## 2022-07-27 DIAGNOSIS — R73.09 IMPAIRED GLUCOSE TOLERANCE TEST: ICD-10-CM

## 2022-07-27 DIAGNOSIS — D72.829 LEUKOCYTOSIS: ICD-10-CM

## 2022-07-27 DIAGNOSIS — E83.2 DISORDER OF ZINC METABOLISM: ICD-10-CM

## 2022-07-27 LAB
ALBUMIN SERPL-MCNC: 3.7 GM/DL (ref 3.5–5)
ALBUMIN/GLOB SERPL: 1 RATIO (ref 1.1–2)
ALP SERPL-CCNC: 101 UNIT/L (ref 40–150)
ALT SERPL-CCNC: 27 UNIT/L (ref 0–55)
AST SERPL-CCNC: 14 UNIT/L (ref 5–34)
BASOPHILS # BLD AUTO: 0.09 X10(3)/MCL (ref 0–0.2)
BASOPHILS NFR BLD AUTO: 0.6 %
BILIRUBIN DIRECT+TOT PNL SERPL-MCNC: 0.3 MG/DL
BUN SERPL-MCNC: 5 MG/DL (ref 7–18.7)
CALCIUM SERPL-MCNC: 8.8 MG/DL (ref 8.4–10.2)
CHLORIDE SERPL-SCNC: 103 MMOL/L (ref 98–107)
CO2 SERPL-SCNC: 28 MMOL/L (ref 22–29)
CREAT SERPL-MCNC: 0.64 MG/DL (ref 0.55–1.02)
CRP SERPL-MCNC: 29.9 MG/L
DEPRECATED CALCIDIOL+CALCIFEROL SERPL-MC: 24.9 NG/ML (ref 30–80)
EOSINOPHIL # BLD AUTO: 0.31 X10(3)/MCL (ref 0–0.9)
EOSINOPHIL NFR BLD AUTO: 2.1 %
ERYTHROCYTE [DISTWIDTH] IN BLOOD BY AUTOMATED COUNT: 14.6 % (ref 11.5–17)
ERYTHROCYTE [SEDIMENTATION RATE] IN BLOOD: 19 MM/HR (ref 0–20)
EST. AVERAGE GLUCOSE BLD GHB EST-MCNC: 116.9 MG/DL
FOLATE SERPL-MCNC: 10.9 NG/ML (ref 7–31.4)
FSH SERPL-ACNC: 3.98 MIU/ML
GLOBULIN SER-MCNC: 3.8 GM/DL (ref 2.4–3.5)
GLUCOSE SERPL-MCNC: 125 MG/DL (ref 74–100)
HBA1C MFR BLD: 5.7 %
HCT VFR BLD AUTO: 42.3 % (ref 37–47)
HGB BLD-MCNC: 14.1 GM/DL (ref 12–16)
IMM GRANULOCYTES # BLD AUTO: 0.09 X10(3)/MCL (ref 0–0.04)
IMM GRANULOCYTES NFR BLD AUTO: 0.6 %
IRON SERPL-MCNC: 55 UG/DL (ref 50–170)
LH SERPL-ACNC: 2.07 MIU/ML
LYMPHOCYTES # BLD AUTO: 4.01 X10(3)/MCL (ref 0.6–4.6)
LYMPHOCYTES NFR BLD AUTO: 26.6 %
MCH RBC QN AUTO: 30.2 PG (ref 27–31)
MCHC RBC AUTO-ENTMCNC: 33.3 MG/DL (ref 33–36)
MCV RBC AUTO: 90.6 FL (ref 80–94)
MONOCYTES # BLD AUTO: 0.99 X10(3)/MCL (ref 0.1–1.3)
MONOCYTES NFR BLD AUTO: 6.6 %
NEUTROPHILS # BLD AUTO: 9.6 X10(3)/MCL (ref 2.1–9.2)
NEUTROPHILS NFR BLD AUTO: 63.5 %
NRBC BLD AUTO-RTO: 0 %
PHOSPHATE SERPL-MCNC: 2.7 MG/DL (ref 2.3–4.7)
PLATELET # BLD AUTO: 362 X10(3)/MCL (ref 130–400)
PMV BLD AUTO: 10.7 FL (ref 7.4–10.4)
POTASSIUM SERPL-SCNC: 3.6 MMOL/L (ref 3.5–5.1)
PROLACTIN LEVEL (OHS): 23.36 NG/ML (ref 5.18–26.53)
PROT SERPL-MCNC: 7.5 GM/DL (ref 6.4–8.3)
RBC # BLD AUTO: 4.67 X10(6)/MCL (ref 4.2–5.4)
RHEUMATOID FACT SERPL-ACNC: <13 IU/ML
SODIUM SERPL-SCNC: 141 MMOL/L (ref 136–145)
T4 SERPL-MCNC: 9.84 UG/DL (ref 4.87–11.72)
TESTOST SERPL-MCNC: 29.15 NG/DL (ref 13.84–53.35)
TSH SERPL-ACNC: 3.16 UIU/ML (ref 0.35–4.94)
VALPROATE SERPL-MCNC: 28.1 UG/ML (ref 50–100)
VIT B12 SERPL-MCNC: 638 PG/ML (ref 213–816)
WBC # SPEC AUTO: 15.1 X10(3)/MCL (ref 4.5–11.5)

## 2022-07-27 PROCEDURE — 82671 ASSAY OF ESTROGENS: CPT

## 2022-07-27 PROCEDURE — 36415 COLL VENOUS BLD VENIPUNCTURE: CPT

## 2022-07-27 PROCEDURE — 84146 ASSAY OF PROLACTIN: CPT

## 2022-07-27 PROCEDURE — 84443 ASSAY THYROID STIM HORMONE: CPT

## 2022-07-27 PROCEDURE — 82670 ASSAY OF TOTAL ESTRADIOL: CPT

## 2022-07-27 PROCEDURE — 82306 VITAMIN D 25 HYDROXY: CPT

## 2022-07-27 PROCEDURE — 83001 ASSAY OF GONADOTROPIN (FSH): CPT

## 2022-07-27 PROCEDURE — 83003 ASSAY GROWTH HORMONE (HGH): CPT

## 2022-07-27 PROCEDURE — 83036 HEMOGLOBIN GLYCOSYLATED A1C: CPT

## 2022-07-27 PROCEDURE — 86617 LYME DISEASE ANTIBODY: CPT

## 2022-07-27 PROCEDURE — 82525 ASSAY OF COPPER: CPT

## 2022-07-27 PROCEDURE — 82607 VITAMIN B-12: CPT

## 2022-07-27 PROCEDURE — 80164 ASSAY DIPROPYLACETIC ACD TOT: CPT

## 2022-07-27 PROCEDURE — 86376 MICROSOMAL ANTIBODY EACH: CPT

## 2022-07-27 PROCEDURE — 84100 ASSAY OF PHOSPHORUS: CPT

## 2022-07-27 PROCEDURE — 82180 ASSAY OF ASCORBIC ACID: CPT

## 2022-07-27 PROCEDURE — 82746 ASSAY OF FOLIC ACID SERUM: CPT

## 2022-07-27 PROCEDURE — 85651 RBC SED RATE NONAUTOMATED: CPT

## 2022-07-27 PROCEDURE — 86431 RHEUMATOID FACTOR QUANT: CPT

## 2022-07-27 PROCEDURE — 83002 ASSAY OF GONADOTROPIN (LH): CPT

## 2022-07-27 PROCEDURE — 84165 PROTEIN E-PHORESIS SERUM: CPT

## 2022-07-27 PROCEDURE — 84436 ASSAY OF TOTAL THYROXINE: CPT

## 2022-07-27 PROCEDURE — 86140 C-REACTIVE PROTEIN: CPT

## 2022-07-27 PROCEDURE — 80053 COMPREHEN METABOLIC PANEL: CPT

## 2022-07-27 PROCEDURE — 84403 ASSAY OF TOTAL TESTOSTERONE: CPT

## 2022-07-27 PROCEDURE — 86235 NUCLEAR ANTIGEN ANTIBODY: CPT

## 2022-07-27 PROCEDURE — 84207 ASSAY OF VITAMIN B-6: CPT

## 2022-07-27 PROCEDURE — 85025 COMPLETE CBC W/AUTO DIFF WBC: CPT

## 2022-07-27 PROCEDURE — 83540 ASSAY OF IRON: CPT

## 2022-07-27 PROCEDURE — 84630 ASSAY OF ZINC: CPT

## 2022-07-27 PROCEDURE — 80175 DRUG SCREEN QUAN LAMOTRIGINE: CPT

## 2022-07-27 PROCEDURE — 86757 RICKETTSIA ANTIBODY: CPT

## 2022-07-27 PROCEDURE — 86653 ENCEPHALTIS ST LOUIS ANTBODY: CPT

## 2022-07-27 PROCEDURE — 84144 ASSAY OF PROGESTERONE: CPT

## 2022-07-28 LAB
B BURGDOR AB SER QL IA: NORMAL
COPPER SERPL-MCNC: 132 MCG/DL (ref 77–206)
LAMOTRIGINE SERPL-MCNC: 10.4 MCG/ML (ref 2.5–15)
MAYO GENERIC ORDERABLE RESULT: NORMAL
PROGEST SERPL IA-MCNC: <0.2 NG/ML
ZINC SERPL-MCNC: 80 MCG/DL (ref 60–106)

## 2022-07-29 DIAGNOSIS — M25.50 JOINT PAIN: Primary | ICD-10-CM

## 2022-07-29 LAB
B BURGDOR AB PATRN SER IB-IMP: NORMAL
B BURGDOR IGG PATRN SER IB-IMP: NORMAL KDA
B BURGDOR IGG SER QL IB: NEGATIVE
B BURGDOR IGM PATRN SER IB-IMP: NORMAL KDA
B BURGDOR IGM SER QL IB: NEGATIVE
EEEV IGG TITR SER IF: NORMAL {TITER}
EEEV IGM TITR SER IF: NORMAL {TITER}
LACV IGG TITR SER IF: NORMAL {TITER}
LACV IGM TITR SER IF: NORMAL {TITER}
PATH REV: NORMAL
PYRIDOXAL PHOS SERPL-MCNC: 5 MCG/L (ref 5–50)
RICK SF IGG TITR SER IF: NORMAL {TITER}
RICK SF IGM TITR SER IF: NORMAL {TITER}
SLEV IGG TITR SER IF: NORMAL {TITER}
SLEV IGM TITR SER IF: NORMAL {TITER}
VIT C SERPL-MCNC: 0.4 MG/DL (ref 0.4–2)
WEEV IGG TITR SER IF: NORMAL {TITER}
WEEV IGM TITR SER IF: NORMAL {TITER}

## 2022-08-02 LAB
ANTINUCLEAR ANTIBODY SCREEN (OHS): NEGATIVE
CENTROMERE PROTEIN ANTIBODY (OHS): NEGATIVE
DSDNA ANTIBODY (OHS): NEGATIVE
ESTRADIOL SERPL-MCNC: 99 PG/ML
ESTRONE SERPL-MCNC: 64 PG/ML
JO-1 ANTIBODY (OHS): NEGATIVE
RNP70 ANTIBODY (OHS): NEGATIVE
SCLERODERMA (SCL-70S) ANTIBODY (OHS): NEGATIVE
SMITH DP IGG (OHS): NEGATIVE
SSA(RO) ANTIBODY (OHS): NEGATIVE
SSB(LA) ANTIBODY (OHS): NEGATIVE
THYROID PEROXIDASE (OLG): NEGATIVE
THYROID PEROXIDASE QUANT (OLG): NORMAL
U1RNP ANTIBODY (OHS): NEGATIVE

## 2022-08-03 LAB
ALBUMIN % SPEP (OHS): 48.78 (ref 48.1–59.5)
ALBUMIN SERPL BCP-MCNC: 3.7 G/DL
ALBUMIN/GLOB SERPL: 1 RATIO
ALPHA 1 GLOB (OHS): 0.32 GM/DL (ref 0–0.4)
ALPHA 1 GLOB% (OHS): 4.24 (ref 2.3–4.9)
ALPHA 2 GLOB % (OHS): 12.58 (ref 6.9–13)
ALPHA 2 GLOB (OHS): 0.94 GM/DL (ref 0.4–1)
BETA GLOB (OHS): 1.3 GM/DL (ref 0.7–1.3)
BETA GLOB% (OHS): 17.38 (ref 13.8–19.7)
GAMMA GLOBULIN % (OHS): 17.02 (ref 10.1–21.9)
GAMMA GLOBULIN (OHS): 1.28 GM/DL (ref 0.4–1.8)
GLOBULIN SER-MCNC: 3.8 GM/DL
M SPIKE % (OHS): NORMAL
M SPIKE (OHS): NORMAL
PROT SERPL-MCNC: 7.5 GM/DL (ref 6.4–8.3)

## 2022-08-05 ENCOUNTER — HOSPITAL ENCOUNTER (OUTPATIENT)
Dept: RADIOLOGY | Facility: HOSPITAL | Age: 47
Discharge: HOME | End: 2022-08-05
Attending: INTERNAL MEDICINE
Payer: MEDICAID

## 2022-08-05 DIAGNOSIS — R13.10 DYSPHAGIA: ICD-10-CM

## 2022-08-05 DIAGNOSIS — R11.10 REGURGITATION OF FOOD: ICD-10-CM

## 2022-08-05 DIAGNOSIS — R12 HEARTBURN: ICD-10-CM

## 2022-08-05 DIAGNOSIS — R49.0 HOARSENESS: ICD-10-CM

## 2022-08-05 PROCEDURE — 78264 GASTRIC EMPTYING IMG STUDY: CPT | Mod: TC

## 2022-09-15 ENCOUNTER — HISTORICAL (OUTPATIENT)
Dept: ADMINISTRATIVE | Facility: HOSPITAL | Age: 47
End: 2022-09-15
Payer: MEDICAID

## 2022-09-16 ENCOUNTER — HISTORICAL (OUTPATIENT)
Dept: ADMINISTRATIVE | Facility: HOSPITAL | Age: 47
End: 2022-09-16
Payer: MEDICAID

## 2022-09-29 DIAGNOSIS — N32.81 OAB (OVERACTIVE BLADDER): Primary | ICD-10-CM

## 2022-09-29 DIAGNOSIS — F33.42 MAJOR DEPRESSION, RECURRENT, FULL REMISSION: Primary | ICD-10-CM

## 2022-09-29 DIAGNOSIS — I10 PRIMARY HYPERTENSION: ICD-10-CM

## 2022-09-29 RX ORDER — LISINOPRIL 20 MG/1
TABLET ORAL
Qty: 30 TABLET | Refills: 4 | Status: SHIPPED | OUTPATIENT
Start: 2022-09-29 | End: 2023-08-30 | Stop reason: SDUPTHER

## 2022-09-29 RX ORDER — ESCITALOPRAM OXALATE 20 MG/1
TABLET ORAL
Qty: 30 TABLET | Refills: 4 | Status: SHIPPED | OUTPATIENT
Start: 2022-09-29 | End: 2023-03-20

## 2022-09-29 RX ORDER — FESOTERODINE FUMARATE 4 MG/1
TABLET, FILM COATED, EXTENDED RELEASE ORAL
Qty: 30 TABLET | Refills: 5 | Status: SHIPPED | OUTPATIENT
Start: 2022-09-29 | End: 2023-03-28

## 2022-10-29 DIAGNOSIS — E78.5 HYPERLIPIDEMIA, UNSPECIFIED HYPERLIPIDEMIA TYPE: Primary | ICD-10-CM

## 2022-10-31 RX ORDER — ATORVASTATIN CALCIUM 20 MG/1
TABLET, FILM COATED ORAL
Qty: 30 TABLET | Refills: 4 | Status: SHIPPED | OUTPATIENT
Start: 2022-10-31 | End: 2023-03-28

## 2023-01-14 ENCOUNTER — HOSPITAL ENCOUNTER (EMERGENCY)
Facility: HOSPITAL | Age: 48
Discharge: HOME | End: 2023-01-14
Attending: STUDENT IN AN ORGANIZED HEALTH CARE EDUCATION/TRAINING PROGRAM
Payer: MEDICAID

## 2023-01-14 VITALS
DIASTOLIC BLOOD PRESSURE: 87 MMHG | BODY MASS INDEX: 42.34 KG/M2 | TEMPERATURE: 98 F | OXYGEN SATURATION: 94 % | RESPIRATION RATE: 18 BRPM | WEIGHT: 248 LBS | HEIGHT: 64 IN | SYSTOLIC BLOOD PRESSURE: 160 MMHG | HEART RATE: 102 BPM

## 2023-01-14 DIAGNOSIS — S93.402A SPRAIN OF LEFT ANKLE, UNSPECIFIED LIGAMENT, INITIAL ENCOUNTER: Primary | ICD-10-CM

## 2023-01-14 DIAGNOSIS — M25.579 ANKLE PAIN: ICD-10-CM

## 2023-01-14 DIAGNOSIS — M25.572 ACUTE LEFT ANKLE PAIN: ICD-10-CM

## 2023-01-14 DIAGNOSIS — M79.606 LEG PAIN: ICD-10-CM

## 2023-01-14 DIAGNOSIS — M79.672 FOOT PAIN, LEFT: ICD-10-CM

## 2023-01-14 PROCEDURE — 96372 THER/PROPH/DIAG INJ SC/IM: CPT | Performed by: STUDENT IN AN ORGANIZED HEALTH CARE EDUCATION/TRAINING PROGRAM

## 2023-01-14 PROCEDURE — 25000003 PHARM REV CODE 250: Performed by: STUDENT IN AN ORGANIZED HEALTH CARE EDUCATION/TRAINING PROGRAM

## 2023-01-14 PROCEDURE — 99284 EMERGENCY DEPT VISIT MOD MDM: CPT

## 2023-01-14 PROCEDURE — 63600175 PHARM REV CODE 636 W HCPCS: Performed by: STUDENT IN AN ORGANIZED HEALTH CARE EDUCATION/TRAINING PROGRAM

## 2023-01-14 RX ORDER — FAMOTIDINE 20 MG/1
20 TABLET, FILM COATED ORAL 2 TIMES DAILY
Qty: 14 TABLET | Refills: 0 | Status: SHIPPED | OUTPATIENT
Start: 2023-01-14 | End: 2023-10-11

## 2023-01-14 RX ORDER — ACETAMINOPHEN 500 MG
1000 TABLET ORAL
Status: COMPLETED | OUTPATIENT
Start: 2023-01-14 | End: 2023-01-14

## 2023-01-14 RX ORDER — NAPROXEN 500 MG/1
500 TABLET ORAL 2 TIMES DAILY WITH MEALS
Qty: 14 TABLET | Refills: 0 | Status: SHIPPED | OUTPATIENT
Start: 2023-01-14 | End: 2023-01-21

## 2023-01-14 RX ORDER — KETOROLAC TROMETHAMINE 30 MG/ML
30 INJECTION, SOLUTION INTRAMUSCULAR; INTRAVENOUS
Status: COMPLETED | OUTPATIENT
Start: 2023-01-14 | End: 2023-01-14

## 2023-01-14 RX ADMIN — ACETAMINOPHEN 1000 MG: 500 TABLET ORAL at 05:01

## 2023-01-14 RX ADMIN — KETOROLAC TROMETHAMINE 30 MG: 30 INJECTION, SOLUTION INTRAMUSCULAR at 05:01

## 2023-01-14 NOTE — ED PROVIDER NOTES
Encounter Date: 2023       History     Chief Complaint   Patient presents with    Ankle Injury     Left ankle injury yesterday.  Unable to move.  Pulses 2+     46 yo F hx of epilepsy that presents for pain to the LLE that occurred secondary to a fall due to seizure that happened yesterday. Pt reports that pain is most prominent in the lateral aspect of the L ankle. She also reports a small abrasion to the L ankle which she has treated at home with abx ointment. She has been able to walk but states it is painful. She denies other areas of pain, injury, or concern. She reports that she frequently has seizures, despite taking her medication as prescribed. She reports good f/u with her neurologist. She denies other symptoms and concerns.       Review of patient's allergies indicates:   Allergen Reactions    Codeine Nausea And Vomiting    Levetiracetam Rash     severe, irritability, pt treathen her neirghbors of cutting their children legs and killing their dogs    Meloxicam Nausea And Vomiting     Past Medical History:   Diagnosis Date    Anxiety disorder, unspecified     Arthropathy of spinal facet joint     Colitis     Diabetes mellitus with diabetic neuropathy     Diabetic neuropathy     Folliculitis     GERD (gastroesophageal reflux disease)     HTN (hypertension)     ELIEL (juvenile myoclonic epilepsy)     Lumbar radiculopathy     Lumbar spinal stenosis     Lumbar spondylosis     Melanocytic nevus of face     Mixed hyperlipidemia     Morbid obesity     Pituitary adenoma     Seizures     Thyroid cyst     Type 2 diabetes mellitus     Urge incontinence of urine      Past Surgical History:   Procedure Laterality Date    BICEPS TENDON REPAIR  2018    CARPAL TUNNEL RELEASE       SECTION  01/10/2001     SECTION  10/09/1998    CHOLECYSTECTOMY      COLONOSCOPY  2017    Dr Marianna Rosas    ESOPHAGOGASTRODUODENOSCOPY N/A 2022    Procedure: EGD (ESOPHAGOGASTRODUODENOSCOPY);  Surgeon:  Jn Herrera MD;  Location: [facility];  Service: Gastroenterology;  Laterality: N/A;    PARTIAL HYSTERECTOMY      ROTATOR CUFF REPAIR  2018    SHOULDER ARTHROSCOPY W/ SUPERIOR LABRAL ANTERIOR POSTERIOR REPAIR  2011    TONSILLECTOMY      TOTAL ABDOMINAL HYSTERECTOMY  2018     Family History   Problem Relation Age of Onset    Hypertension Mother     Hyperlipidemia Mother     No Known Problems Father      Social History     Tobacco Use    Smoking status: Former     Packs/day: 0.25     Years: 30.00     Pack years: 7.50     Types: Cigarettes     Quit date: 6/3/2022     Years since quittin.6    Smokeless tobacco: Never   Substance Use Topics    Alcohol use: Not Currently    Drug use: Not Currently     Types: Marijuana     Comment: has not in the past 4 months      Review of Systems   Constitutional:  Negative for fever.   HENT:  Negative for sore throat.    Respiratory:  Negative for shortness of breath.    Cardiovascular:  Negative for chest pain.   Gastrointestinal:  Negative for nausea.   Genitourinary:  Negative for dysuria.   Musculoskeletal:  Negative for back pain.   Skin:  Negative for rash.   Neurological:  Negative for weakness.   Hematological:  Does not bruise/bleed easily.     Physical Exam     Initial Vitals [23 1731]   BP Pulse Resp Temp SpO2   (!) 160/87 102 18 98.3 °F (36.8 °C) (!) 94 %      MAP       --         Physical Exam    Constitutional: She appears well-developed and well-nourished. She is not diaphoretic. She is cooperative.  Non-toxic appearance. She does not have a sickly appearance. She does not appear ill. No distress.   HENT:   Head: Normocephalic and atraumatic.   Eyes: Conjunctivae and EOM are normal.   Neck: Trachea normal and phonation normal. Neck supple. No stridor present.   Normal range of motion.  Cardiovascular:  Normal rate, regular rhythm, normal heart sounds, intact distal pulses and normal pulses.           Pulmonary/Chest: Breath sounds normal. No  respiratory distress.   Abdominal: Abdomen is soft. She exhibits no distension. There is no abdominal tenderness.   Musculoskeletal:      Cervical back: Normal, normal range of motion and neck supple.      Thoracic back: Normal.      Lumbar back: Normal.      Right hip: Normal.      Left hip: Normal.      Right knee: Normal.      Left knee: Normal.      Right lower leg: Normal.      Left lower leg: Tenderness present.      Right ankle: Normal.      Right Achilles Tendon: Normal.      Left ankle: Swelling present. No deformity. Tenderness present. No medial malleolus tenderness. Decreased range of motion.      Left Achilles Tendon: Normal.      Right foot: Normal.      Left foot: Normal.     Neurological: She is alert and oriented to person, place, and time. She has normal strength. No cranial nerve deficit or sensory deficit. GCS eye subscore is 4. GCS verbal subscore is 5. GCS motor subscore is 6.   Skin: Skin is warm, dry and intact.   Psychiatric: She has a normal mood and affect. Her speech is normal and behavior is normal. Judgment and thought content normal. Cognition and memory are normal.       ED Course   Procedures  Labs Reviewed - No data to display       Imaging Results              X-Ray Tibia Fibula 2 View Left (Final result)  Result time 01/14/23 18:08:17      Final result by Frederic Eaton MD (01/14/23 18:08:17)                   Impression:      1. Calcaneal enthesophyte.    2. No acute osseous abnormality identified.      Electronically signed by: Frederic Eaton  Date:    01/14/2023  Time:    18:08               Narrative:    EXAMINATION:  XR TIBIA FIBULA 2 VIEW LEFT    CLINICAL HISTORY:  Pain in leg, unspecified    TECHNIQUE:  Two-view    COMPARISON:  None available    FINDINGS:  Articular surfaces alignment is unremarkable.  No intrinsic osseous abnormality identified no acute fracture dislocation.  There is calcaneal enthesophyte.                                       X-Ray Ankle Complete Left  (Final result)  Result time 01/14/23 18:02:59      Final result by Frederic Eaton MD (01/14/23 18:02:59)                   Impression:      No acute osseous abnormality identified.      Electronically signed by: Frederic Eaton  Date:    01/14/2023  Time:    18:02               Narrative:    EXAMINATION:  Left ankle three views.    CLINICAL HISTORY:  Pain.    TECHNIQUE:  Three views.    COMPARISON:  None available .    FINDINGS:  There is soft inflammation overlying the lateral malleolus.  Articular surfaces are unremarkable and there is no intrinsic osseous abnormality.  There is no acute fracture, dislocation or arthritic change.  Position and alignment is satisfactory.  There is unremarkable mineralization of the bones.  Calcaneal enthesophyte.                                       X-Ray Foot Complete Left (Final result)  Result time 01/14/23 18:04:46      Final result by Frederic Eaton MD (01/14/23 18:04:46)                   Impression:      Calcaneal enthesophyte.      Electronically signed by: Frederic Eaton  Date:    01/14/2023  Time:    18:04               Narrative:    EXAMINATION:  XR FOOT COMPLETE 3 VIEW LEFT    CLINICAL HISTORY:  Pain.    TECHNIQUE:  Three views    COMPARISON:  None available.    FINDINGS:  Left foot articular surfaces are unremarkable and there is no intrinsic osseous abnormality.  There is no acute fracture, dislocation or arthritic change.  Position and alignment is satisfactory.  There is unremarkable mineralization of the bones.  There is calcaneal enthesophyte.                                       Medications   ketorolac injection 30 mg (30 mg Intramuscular Given 1/14/23 1747)   acetaminophen tablet 1,000 mg (1,000 mg Oral Given 1/14/23 1747)     Medical Decision Making:   Initial Assessment:   48 yo F that presented for evaluation of LLE pain after a fall which occurred a day ago. She was able to walk with a limp. No deformities on exam. Mild soft tissue swelling noted to the  lateral aspect of the L ankle with superficial abrasions.   Differential Diagnosis:   Fracture, sprain, dislocation, abrasions, contusion  Clinical Tests:   Radiological Study: Ordered and Reviewed  ED Management:  Her pain was treated. Tetanus UTD. XR did not demonstrate fx. Ace wrap applied. She was discharged with Rx for naproxen and famotidine. Return precautions and need for f/u discussed. She was understanding and agreeable with plan.                         Clinical Impression:   Final diagnoses:  [M79.606] Leg pain  [M25.579] Ankle pain  [M79.672] Foot pain, left  [S93.402A] Sprain of left ankle, unspecified ligament, initial encounter (Primary)  [M25.572] Acute left ankle pain        ED Disposition Condition    Discharge Stable          ED Prescriptions       Medication Sig Dispense Start Date End Date Auth. Provider    naproxen (NAPROSYN) 500 MG tablet Take 1 tablet (500 mg total) by mouth 2 (two) times daily with meals. for 7 days 14 tablet 1/14/2023 1/21/2023 Sarbjit Qureshi MD    famotidine (PEPCID) 20 MG tablet Take 1 tablet (20 mg total) by mouth 2 (two) times daily. for 7 days 14 tablet 1/14/2023 1/21/2023 Sarbjit Qureshi MD          Follow-up Information       Follow up With Specialties Details Why Contact Dinora Nye DO Family Medicine, Hospitalist Schedule an appointment as soon as possible for a visit   [address] [facility] [address]  [phone]      [facility] - Emergency Dept Emergency Medicine Go to  If symptoms worsen [address] [facility] [id number] [phone]             Sarbjit Qureshi MD  01/14/23 [id number]

## 2023-01-15 NOTE — DISCHARGE INSTRUCTIONS
Return to the ER for new or worsening symptoms. Follow-up with your primary care provider as soon as possible.

## 2023-01-15 NOTE — ED NOTES
Pt to Ed with c/o L ankle swelling and pain after falling yesterday during a seizure. Swelling, limited ROM noted. Numbness reported for entire foot. Pulses noted.

## 2023-01-16 DIAGNOSIS — D72.829 LEUKOCYTOSIS: Primary | ICD-10-CM

## 2023-01-23 ENCOUNTER — TELEPHONE (OUTPATIENT)
Dept: HEMATOLOGY/ONCOLOGY | Facility: CLINIC | Age: 48
End: 2023-01-23
Payer: MEDICAID

## 2023-02-15 NOTE — TELEPHONE ENCOUNTER
Called patient to follow up on R/S apt and if Lab have been done.  She states she has not found a ride to bring her to get labs done and as soon as she finds a ride she will call our office to Schedule apt

## 2023-03-28 DIAGNOSIS — N32.81 OAB (OVERACTIVE BLADDER): ICD-10-CM

## 2023-03-28 DIAGNOSIS — E78.5 HYPERLIPIDEMIA, UNSPECIFIED HYPERLIPIDEMIA TYPE: ICD-10-CM

## 2023-03-28 RX ORDER — ATORVASTATIN CALCIUM 20 MG/1
TABLET, FILM COATED ORAL
Qty: 30 TABLET | Refills: 5 | Status: SHIPPED | OUTPATIENT
Start: 2023-03-28 | End: 2023-08-30 | Stop reason: SDUPTHER

## 2023-03-28 RX ORDER — FESOTERODINE FUMARATE 4 MG/1
TABLET, FILM COATED, EXTENDED RELEASE ORAL
Qty: 30 TABLET | Refills: 5 | Status: SHIPPED | OUTPATIENT
Start: 2023-03-28 | End: 2023-08-30 | Stop reason: SDUPTHER

## 2023-06-22 DIAGNOSIS — I10 PRIMARY HYPERTENSION: ICD-10-CM

## 2023-06-22 RX ORDER — LISINOPRIL 20 MG/1
TABLET ORAL
Qty: 30 TABLET | Refills: 4 | OUTPATIENT
Start: 2023-06-22

## 2023-07-24 DIAGNOSIS — I10 PRIMARY HYPERTENSION: ICD-10-CM

## 2023-07-24 RX ORDER — LISINOPRIL 20 MG/1
TABLET ORAL
Qty: 30 TABLET | Refills: 4 | OUTPATIENT
Start: 2023-07-24

## 2023-08-30 DIAGNOSIS — N32.81 OAB (OVERACTIVE BLADDER): ICD-10-CM

## 2023-08-30 DIAGNOSIS — E78.5 HYPERLIPIDEMIA, UNSPECIFIED HYPERLIPIDEMIA TYPE: ICD-10-CM

## 2023-08-30 DIAGNOSIS — F33.42 MAJOR DEPRESSION, RECURRENT, FULL REMISSION: ICD-10-CM

## 2023-08-30 DIAGNOSIS — I10 PRIMARY HYPERTENSION: ICD-10-CM

## 2023-08-30 RX ORDER — FESOTERODINE FUMARATE 4 MG/1
4 TABLET, EXTENDED RELEASE ORAL DAILY
Qty: 30 TABLET | Refills: 5 | Status: SHIPPED | OUTPATIENT
Start: 2023-08-30

## 2023-08-30 RX ORDER — LISINOPRIL 20 MG/1
20 TABLET ORAL DAILY
Qty: 30 TABLET | Refills: 4 | Status: SHIPPED | OUTPATIENT
Start: 2023-08-30 | End: 2024-01-31

## 2023-08-30 RX ORDER — ESCITALOPRAM OXALATE 20 MG/1
20 TABLET ORAL DAILY
Qty: 30 TABLET | Refills: 4 | Status: SHIPPED | OUTPATIENT
Start: 2023-08-30 | End: 2024-01-31

## 2023-08-30 RX ORDER — ATORVASTATIN CALCIUM 20 MG/1
20 TABLET, FILM COATED ORAL DAILY
Qty: 30 TABLET | Refills: 5 | Status: SHIPPED | OUTPATIENT
Start: 2023-08-30

## 2023-10-11 ENCOUNTER — OFFICE VISIT (OUTPATIENT)
Dept: FAMILY MEDICINE | Facility: CLINIC | Age: 48
End: 2023-10-11
Payer: MEDICAID

## 2023-10-11 VITALS
SYSTOLIC BLOOD PRESSURE: 116 MMHG | OXYGEN SATURATION: 97 % | HEIGHT: 64 IN | BODY MASS INDEX: 40.87 KG/M2 | HEART RATE: 92 BPM | TEMPERATURE: 98 F | WEIGHT: 239.38 LBS | RESPIRATION RATE: 18 BRPM | DIASTOLIC BLOOD PRESSURE: 77 MMHG

## 2023-10-11 DIAGNOSIS — I10 PRIMARY HYPERTENSION: ICD-10-CM

## 2023-10-11 DIAGNOSIS — E66.01 CLASS 3 SEVERE OBESITY DUE TO EXCESS CALORIES WITH SERIOUS COMORBIDITY AND BODY MASS INDEX (BMI) OF 40.0 TO 44.9 IN ADULT: Chronic | ICD-10-CM

## 2023-10-11 DIAGNOSIS — E03.9 HYPOTHYROIDISM, UNSPECIFIED TYPE: ICD-10-CM

## 2023-10-11 DIAGNOSIS — E11.42 TYPE 2 DIABETES MELLITUS WITH DIABETIC POLYNEUROPATHY, WITHOUT LONG-TERM CURRENT USE OF INSULIN: Primary | ICD-10-CM

## 2023-10-11 DIAGNOSIS — F41.1 GAD (GENERALIZED ANXIETY DISORDER): ICD-10-CM

## 2023-10-11 PROCEDURE — 3008F PR BODY MASS INDEX (BMI) DOCUMENTED: ICD-10-PCS | Mod: CPTII,,, | Performed by: FAMILY MEDICINE

## 2023-10-11 PROCEDURE — 1159F PR MEDICATION LIST DOCUMENTED IN MEDICAL RECORD: ICD-10-PCS | Mod: CPTII,,, | Performed by: FAMILY MEDICINE

## 2023-10-11 PROCEDURE — 3008F BODY MASS INDEX DOCD: CPT | Mod: CPTII,,, | Performed by: FAMILY MEDICINE

## 2023-10-11 PROCEDURE — 1160F PR REVIEW ALL MEDS BY PRESCRIBER/CLIN PHARMACIST DOCUMENTED: ICD-10-PCS | Mod: CPTII,,, | Performed by: FAMILY MEDICINE

## 2023-10-11 PROCEDURE — 1159F MED LIST DOCD IN RCRD: CPT | Mod: CPTII,,, | Performed by: FAMILY MEDICINE

## 2023-10-11 PROCEDURE — 4010F PR ACE/ARB THEARPY RXD/TAKEN: ICD-10-PCS | Mod: CPTII,,, | Performed by: FAMILY MEDICINE

## 2023-10-11 PROCEDURE — 99214 PR OFFICE/OUTPT VISIT, EST, LEVL IV, 30-39 MIN: ICD-10-PCS | Mod: ,,, | Performed by: FAMILY MEDICINE

## 2023-10-11 PROCEDURE — 1160F RVW MEDS BY RX/DR IN RCRD: CPT | Mod: CPTII,,, | Performed by: FAMILY MEDICINE

## 2023-10-11 PROCEDURE — 3074F SYST BP LT 130 MM HG: CPT | Mod: CPTII,,, | Performed by: FAMILY MEDICINE

## 2023-10-11 PROCEDURE — 4010F ACE/ARB THERAPY RXD/TAKEN: CPT | Mod: CPTII,,, | Performed by: FAMILY MEDICINE

## 2023-10-11 PROCEDURE — 3078F DIAST BP <80 MM HG: CPT | Mod: CPTII,,, | Performed by: FAMILY MEDICINE

## 2023-10-11 PROCEDURE — 3078F PR MOST RECENT DIASTOLIC BLOOD PRESSURE < 80 MM HG: ICD-10-PCS | Mod: CPTII,,, | Performed by: FAMILY MEDICINE

## 2023-10-11 PROCEDURE — 3074F PR MOST RECENT SYSTOLIC BLOOD PRESSURE < 130 MM HG: ICD-10-PCS | Mod: CPTII,,, | Performed by: FAMILY MEDICINE

## 2023-10-11 PROCEDURE — 99214 OFFICE O/P EST MOD 30 MIN: CPT | Mod: ,,, | Performed by: FAMILY MEDICINE

## 2023-10-11 RX ORDER — HYDROXYZINE HYDROCHLORIDE 25 MG/1
25 TABLET, FILM COATED ORAL 4 TIMES DAILY PRN
Qty: 40 TABLET | Refills: 0 | Status: SHIPPED | OUTPATIENT
Start: 2023-10-11 | End: 2023-10-21

## 2023-10-11 NOTE — PROGRESS NOTES
Patient ID: [id number]     Chief Complaint: Follow-up and Anxiety    HPI:     Amy Driscoll is a 48 y.o. female here today for Follow-up and Anxiety. Type 2 diabetes with polyneuropathy, HTN, HDL, Hypothyroidism. Has not been checking her blood glucose levels at home. Has not had medical care for the past year. Reporting breakthrough anxiety despite taking lexapro 20mg.   Receives counseling from [facility] via telephone calls only.   ----------------------------  Anxiety disorder, unspecified  Arthropathy of spinal facet joint  Colitis  Diabetes mellitus with diabetic neuropathy  Diabetic neuropathy  Folliculitis  GERD (gastroesophageal reflux disease)  HTN (hypertension)  ELIEL (juvenile myoclonic epilepsy)  Lumbar radiculopathy  Lumbar spinal stenosis  Lumbar spondylosis  Melanocytic nevus of face  Mixed hyperlipidemia  Morbid obesity  Pituitary adenoma  Seizures  Thyroid cyst  Type 2 diabetes mellitus  Urge incontinence of urine     Past Surgical History:   Procedure Laterality Date    BICEPS TENDON REPAIR  2018    CARPAL TUNNEL RELEASE       SECTION  01/10/2001     SECTION  10/09/1998    CHOLECYSTECTOMY      COLONOSCOPY  2017    Dr Marianna Rosas    ESOPHAGOGASTRODUODENOSCOPY N/A 2022    Procedure: EGD (ESOPHAGOGASTRODUODENOSCOPY);  Surgeon: Jn Herrera MD;  Location: [facility];  Service: Gastroenterology;  Laterality: N/A;    PARTIAL HYSTERECTOMY      ROTATOR CUFF REPAIR  2018    SHOULDER ARTHROSCOPY W/ SUPERIOR LABRAL ANTERIOR POSTERIOR REPAIR  2011    TONSILLECTOMY      TOTAL ABDOMINAL HYSTERECTOMY  2018       Review of patient's allergies indicates:   Allergen Reactions    Codeine Nausea And Vomiting    Levetiracetam Rash     severe, irritability, pt treathen her neirghbors of cutting their children legs and killing their dogs    Meloxicam Nausea And Vomiting       Outpatient Medications Marked as Taking for the 10/11/23 encounter  (Office Visit) with Bharathi Nye DO   Medication Sig Dispense Refill    atorvastatin (LIPITOR) 20 MG tablet Take 1 tablet (20 mg total) by mouth once daily. 30 tablet 5    divalproex ER (DEPAKOTE) 500 MG Tb24 Take 1,500 mg by mouth.      EScitalopram oxalate (LEXAPRO) 20 MG tablet Take 1 tablet (20 mg total) by mouth once daily. 30 tablet 4    fesoterodine (TOVIAZ) 4 mg Tb24 Take 1 tablet (4 mg total) by mouth once daily. 30 tablet 5    lamoTRIgine (LAMICTAL) 200 MG tablet Take 1 tablet (200 mg total) by mouth 2 (two) times daily. 30 tablet 1    lisinopriL (PRINIVIL,ZESTRIL) 20 MG tablet Take 1 tablet (20 mg total) by mouth once daily. 30 tablet 4    primidone (MYSOLINE) 50 MG Tab Take 50 mg by mouth once daily.      VALTOCO 15 mg/2 spray (7.5/0.1mL x 2) Spry SPRAY 1 SPRAY IN NOSTRIL ONCE X2 DOSES. MAY REPEAT ONCE IN 4 HOURS IF NEEDED 2 each 1       Social History     Socioeconomic History    Marital status:    Tobacco Use    Smoking status: Former     Current packs/day: 0.00     Average packs/day: 0.3 packs/day for 30.0 years (7.5 ttl pk-yrs)     Types: Cigarettes     Start date: 6/3/1992     Quit date: 6/3/2022     Years since quittin.3    Smokeless tobacco: Never   Substance and Sexual Activity    Alcohol use: Not Currently    Drug use: Not Currently     Types: Marijuana     Comment: has not in the past 4 months     Sexual activity: Yes     Birth control/protection: See Surgical Hx     Social Determinants of Health     Financial Resource Strain: Low Risk  (10/11/2023)    Overall Financial Resource Strain (CARDIA)     Difficulty of Paying Living Expenses: Not hard at all   Food Insecurity: No Food Insecurity (10/11/2023)    Hunger Vital Sign     Worried About Running Out of Food in the Last Year: Never true     Ran Out of Food in the Last Year: Never true   Transportation Needs: No Transportation Needs (10/11/2023)    PRAPARE - Transportation     Lack of Transportation (Medical): No     Lack of  "Transportation (Non-Medical): No   Physical Activity: Insufficiently Active (10/11/2023)    Exercise Vital Sign     Days of Exercise per Week: 4 days     Minutes of Exercise per Session: 30 min   Stress: Stress Concern Present (10/11/2023)    [employer] - Occupational Stress Questionnaire     Feeling of Stress : Rather much   Housing Stability: Low Risk  (10/11/2023)    Housing Stability Vital Sign     Unable to Pay for Housing in the Last Year: No     Number of Places Lived in the Last Year: 1     Unstable Housing in the Last Year: No        Family History   Problem Relation Age of Onset    Hypertension Mother     Hyperlipidemia Mother     No Known Problems Father         Patient Care Team:  Bharathi Nye DO as PCP - General (Family Medicine)     Subjective:     Review of Systems   Constitutional:  Negative for chills and fever.   Respiratory:  Negative for shortness of breath.    Cardiovascular:  Negative for chest pain.   Gastrointestinal:  Negative for constipation and diarrhea.   Neurological:  Negative for dizziness and headaches.   Psychiatric/Behavioral:  The patient does not have insomnia.        See HPI for details  All Other ROS: Negative except as stated in HPI.     Objective:     /77   Pulse 92   Temp 98.1 °F (36.7 °C) (Tympanic)   Resp 18   Ht 5' 3.78" (1.62 m)   Wt 108.6 kg (239 lb 6.4 oz)   SpO2 97%   BMI 41.38 kg/m²     Physical Exam  Vitals reviewed.   Constitutional:       General: She is not in acute distress.     Appearance: Normal appearance. She is obese.   Cardiovascular:      Rate and Rhythm: Normal rate and regular rhythm.      Heart sounds: No murmur heard.     No friction rub. No gallop.   Pulmonary:      Effort: No respiratory distress.      Breath sounds: No wheezing, rhonchi or rales.   Musculoskeletal:         General: No swelling, tenderness or deformity.      Right lower leg: No edema.      Left lower leg: No edema.   Skin:     " General: Skin is warm and dry.      Findings: No lesion or rash.   Neurological:      General: No focal deficit present.      Mental Status: She is alert.   Psychiatric:         Mood and Affect: Mood normal.       Assessment/Plan:     1. Type 2 diabetes mellitus with diabetic polyneuropathy, without long-term current use of insulin  -     Comprehensive Metabolic Panel; Future; Expected date: 10/11/2023  -     Lipid Panel; Future; Expected date: 10/11/2023  -     Hemoglobin A1C; Future; Expected date: 10/11/2023  -     Urinalysis; Future; Expected date: 10/11/2023  -     T4, Free; Future; Expected date: 10/11/2023  -     Microalbumin/Creatinine Ratio, Urine; Future; Expected date: 10/11/2023    2. Primary hypertension  Orders:  -     CBC Auto Differential; Future; Expected date: 10/11/2023    3. Hypothyroidism, unspecified type  -     TSH; Future; Expected date: 10/11/2023  -     T4, Free; Future; Expected date: 10/11/2023    4. Class 3 severe obesity due to excess calories with serious comorbidity and body mass index (BMI) of 40.0 to 44.9 in adult  Overview:  Patient has been counseled on dietary changes, portion control, calorie counting, exercise and weight loss.     Follow up:     Follow up in about 3 months (around 1/11/2024) for Wellness. In addition to their scheduled follow up, the patient has also been instructed to follow up on as needed basis.

## 2024-01-04 ENCOUNTER — HOSPITAL ENCOUNTER (EMERGENCY)
Facility: HOSPITAL | Age: 49
Discharge: HOME | End: 2024-01-04
Attending: FAMILY MEDICINE
Payer: MEDICAID

## 2024-01-04 VITALS
BODY MASS INDEX: 36.1 KG/M2 | RESPIRATION RATE: 14 BRPM | HEART RATE: 97 BPM | TEMPERATURE: 99 F | SYSTOLIC BLOOD PRESSURE: 122 MMHG | HEIGHT: 67 IN | OXYGEN SATURATION: 96 % | WEIGHT: 230 LBS | DIASTOLIC BLOOD PRESSURE: 93 MMHG

## 2024-01-04 DIAGNOSIS — J02.9 PHARYNGITIS, UNSPECIFIED ETIOLOGY: ICD-10-CM

## 2024-01-04 DIAGNOSIS — J10.1 INFLUENZA A: Primary | ICD-10-CM

## 2024-01-04 LAB
FLUAV AG UPPER RESP QL IA.RAPID: DETECTED
FLUBV AG UPPER RESP QL IA.RAPID: NOT DETECTED
RSV A 5' UTR RNA NPH QL NAA+PROBE: NOT DETECTED
SARS-COV-2 RNA RESP QL NAA+PROBE: NOT DETECTED
STREP A PCR (OHS): NOT DETECTED

## 2024-01-04 PROCEDURE — 99284 EMERGENCY DEPT VISIT MOD MDM: CPT | Mod: 25

## 2024-01-04 PROCEDURE — 0241U COVID/RSV/FLU A&B PCR: CPT | Performed by: FAMILY MEDICINE

## 2024-01-04 PROCEDURE — 63600175 PHARM REV CODE 636 W HCPCS: Performed by: FAMILY MEDICINE

## 2024-01-04 PROCEDURE — 96374 THER/PROPH/DIAG INJ IV PUSH: CPT

## 2024-01-04 PROCEDURE — 87651 STREP A DNA AMP PROBE: CPT | Performed by: FAMILY MEDICINE

## 2024-01-04 RX ORDER — OSELTAMIVIR PHOSPHATE 75 MG/1
75 CAPSULE ORAL 2 TIMES DAILY
Qty: 10 CAPSULE | Refills: 0 | Status: SHIPPED | OUTPATIENT
Start: 2024-01-04 | End: 2024-01-09

## 2024-01-04 RX ORDER — PROMETHAZINE HYDROCHLORIDE AND DEXTROMETHORPHAN HYDROBROMIDE 6.25; 15 MG/5ML; MG/5ML
7.5 SYRUP ORAL EVERY 6 HOURS PRN
Qty: 150 ML | Refills: 0 | Status: SHIPPED | OUTPATIENT
Start: 2024-01-04 | End: 2024-01-14

## 2024-01-04 RX ADMIN — LORAZEPAM 1 MG: 2 INJECTION INTRAMUSCULAR; INTRAVENOUS at 03:01

## 2024-01-04 NOTE — ED PROVIDER NOTES
Encounter Date: 2024       History     Chief Complaint   Patient presents with    Sore Throat     Sore throat and fatigue x 3 days.  Seizure in lobby, staff present.       This is a 48-year-old white female who presents complaining of 3 day history of sore throat, runny nose, stuffy nose, and cough of productive yellow sputum.  She is also complaining of decreased appetite and body aches.  She has a past medical history of anxiety disorder, diabetes mellitus, hypertension, seizure, and hyperlipidemia    The history is provided by the patient.     Review of patient's allergies indicates:   Allergen Reactions    Codeine Nausea And Vomiting    Levetiracetam Rash     severe, irritability, pt treathen her neirghbors of cutting their children legs and killing their dogs    Meloxicam Nausea And Vomiting     Past Medical History:   Diagnosis Date    Anxiety disorder, unspecified     Arthropathy of spinal facet joint     Colitis     Diabetes mellitus with diabetic neuropathy     Diabetic neuropathy     Folliculitis     GERD (gastroesophageal reflux disease)     HTN (hypertension)     ELIEL (juvenile myoclonic epilepsy)     Lumbar radiculopathy     Lumbar spinal stenosis     Lumbar spondylosis     Melanocytic nevus of face     Mixed hyperlipidemia     Morbid obesity     Pituitary adenoma     Seizures     Thyroid cyst     Type 2 diabetes mellitus     Urge incontinence of urine      Past Surgical History:   Procedure Laterality Date    BICEPS TENDON REPAIR  2018    CARPAL TUNNEL RELEASE       SECTION  01/10/2001     SECTION  10/09/1998    CHOLECYSTECTOMY      COLONOSCOPY  2017    Dr Marianna Rosas    ESOPHAGOGASTRODUODENOSCOPY N/A 2022    Procedure: EGD (ESOPHAGOGASTRODUODENOSCOPY);  Surgeon: Jn Herrera MD;  Location: [facility];  Service: Gastroenterology;  Laterality: N/A;    PARTIAL HYSTERECTOMY      ROTATOR CUFF REPAIR  2018    SHOULDER ARTHROSCOPY W/ SUPERIOR LABRAL ANTERIOR  POSTERIOR REPAIR  2011    TONSILLECTOMY      TOTAL ABDOMINAL HYSTERECTOMY  2018     Family History   Problem Relation Age of Onset    Hypertension Mother     Hyperlipidemia Mother     No Known Problems Father      Social History     Tobacco Use    Smoking status: Former     Current packs/day: 0.00     Average packs/day: 0.3 packs/day for 30.0 years (7.5 ttl pk-yrs)     Types: Cigarettes     Start date: 6/3/1992     Quit date: 6/3/2022     Years since quittin.5    Smokeless tobacco: Never   Substance Use Topics    Alcohol use: Not Currently    Drug use: Not Currently     Types: Marijuana     Comment: has not in the past 4 months      Review of Systems   Constitutional:  Positive for appetite change. Negative for fever.   HENT:  Positive for rhinorrhea, sore throat and trouble swallowing. Negative for congestion.    Eyes:  Negative for visual disturbance.   Respiratory:  Positive for cough. Negative for shortness of breath.    Cardiovascular:  Negative for chest pain.   Gastrointestinal:  Negative for abdominal pain.   Musculoskeletal:  Positive for arthralgias and myalgias. Negative for neck pain.   Skin:  Negative for rash.   Neurological:  Negative for headaches.   Psychiatric/Behavioral:  Negative for behavioral problems.    All other systems reviewed and are negative.      Physical Exam     Initial Vitals   BP Pulse Resp Temp SpO2   24 1512 24 1525 24 1525 24 1525 24 1525   126/85 (!) 116 20 98.6 °F (37 °C) 96 %      MAP       --                Physical Exam    Nursing note and vitals reviewed.  Constitutional: She appears distressed.   HENT:   Head: Normocephalic and atraumatic.   Eyes: Pupils are equal, round, and reactive to light.   Neck: Neck supple.   Normal range of motion.  Cardiovascular:  Normal rate, regular rhythm and normal heart sounds.           Pulmonary/Chest: Breath sounds normal.   Abdominal: Abdomen is soft. Bowel sounds are normal.   Musculoskeletal:          General: Normal range of motion.      Cervical back: Normal range of motion and neck supple.     Neurological: She is alert and oriented to person, place, and time.         ED Course   Procedures  Labs Reviewed   COVID/RSV/FLU A&B PCR - Abnormal; Notable for the following components:       Result Value    Influenza A PCR Detected (*)     All other components within normal limits    Narrative:     The Xpert Xpress SARS-CoV-2/FLU/RSV plus is a rapid, multiplexed real-time PCR test intended for the simultaneous qualitative detection and differentiation of SARS-CoV-2, Influenza A, Influenza B, and respiratory syncytial virus (RSV) viral RNA in either nasopharyngeal swab or nasal swab specimens.         STREP GROUP A BY PCR - Normal    Narrative:     The Xpert Xpress Strep A test is a rapid, qualitative in vitro diagnostic test for the detection of Streptococcus pyogenes (Group A ß-hemolytic Streptococcus, Strep A) in throat swab specimens from patients with signs and symptoms of pharyngitis.            Imaging Results    None          Medications   LORazepam (ATIVAN) injection 1 mg (1 mg Intravenous Given 1/4/24 1521)     Medical Decision Making  Amount and/or Complexity of Data Reviewed  Labs: ordered.    Risk  Prescription drug management.                                      Clinical Impression:  Final diagnoses:  [J10.1] Influenza A (Primary)  [J02.9] Pharyngitis, unspecified etiology          ED Disposition Condition    Discharge Stable          ED Prescriptions       Medication Sig Dispense Start Date End Date Auth. Provider    oseltamivir (TAMIFLU) 75 MG capsule Take 1 capsule (75 mg total) by mouth 2 (two) times daily. for 5 days 10 capsule 1/4/2024 1/9/2024 Corrine Chaudhary MD    promethazine-dextromethorphan (PROMETHAZINE-DM) 6.25-15 mg/5 mL Syrp Take 7.5 mLs by mouth every 6 (six) hours as needed. 150 mL 1/4/2024 1/14/2024 Corrine Chaudhary MD          Follow-up Information       Follow up  With Specialties Details Why Contact Info    Bharathi Nye, DO Family Medicine In 1 day  [address] [facility] [address]  [phone]               Corrine Chaudhary MD  01/04/24 [id number]

## 2024-01-04 NOTE — ED NOTES
Pt waiting in lobby, a family member alerted staff that pt was having seizure like activity. Pt moved to floor in waiting room. Additional staff called for assistance to move pt from floor to stretcher. No respiratory distress noted during activity. Pt placed on stretcher and moved to room 4

## 2024-01-22 DIAGNOSIS — Z11.59 NEED FOR HEPATITIS C SCREENING TEST: ICD-10-CM

## 2024-01-22 DIAGNOSIS — Z00.00 WELLNESS EXAMINATION: Primary | ICD-10-CM

## 2024-01-22 DIAGNOSIS — Z11.4 ENCOUNTER FOR SCREENING FOR HUMAN IMMUNODEFICIENCY VIRUS (HIV): ICD-10-CM

## 2024-01-31 DIAGNOSIS — I10 PRIMARY HYPERTENSION: ICD-10-CM

## 2024-01-31 DIAGNOSIS — F33.42 MAJOR DEPRESSION, RECURRENT, FULL REMISSION: ICD-10-CM

## 2024-01-31 RX ORDER — ESCITALOPRAM OXALATE 20 MG/1
20 TABLET ORAL
Qty: 30 TABLET | Refills: 4 | Status: SHIPPED | OUTPATIENT
Start: 2024-01-31

## 2024-01-31 RX ORDER — LISINOPRIL 20 MG/1
20 TABLET ORAL
Qty: 30 TABLET | Refills: 4 | Status: SHIPPED | OUTPATIENT
Start: 2024-01-31

## 2024-07-08 DIAGNOSIS — I10 PRIMARY HYPERTENSION: ICD-10-CM

## 2024-07-08 RX ORDER — LISINOPRIL 20 MG/1
20 TABLET ORAL DAILY
Qty: 30 TABLET | Refills: 0 | OUTPATIENT
Start: 2024-07-08

## 2024-07-08 RX ORDER — LISINOPRIL 20 MG/1
20 TABLET ORAL DAILY
Qty: 30 TABLET | Refills: 0 | Status: SHIPPED | OUTPATIENT
Start: 2024-07-08

## 2024-08-07 DIAGNOSIS — I10 PRIMARY HYPERTENSION: ICD-10-CM

## 2024-08-07 RX ORDER — LISINOPRIL 20 MG/1
20 TABLET ORAL
Qty: 30 TABLET | Refills: 0 | Status: SHIPPED | OUTPATIENT
Start: 2024-08-07

## (undated) DEVICE — MOUTHPIECE ENDO 60FR

## (undated) DEVICE — LINER SUCTION KV 5 2000ML

## (undated) DEVICE — KIT SURGICAL COLON .25 1.1OZ

## (undated) DEVICE — ADAPTER AIR/WATER CHANNEL CLEA

## (undated) DEVICE — FORCEP ALLIGATOR BX NDL 2.8MM